# Patient Record
Sex: FEMALE | Race: WHITE | NOT HISPANIC OR LATINO
[De-identification: names, ages, dates, MRNs, and addresses within clinical notes are randomized per-mention and may not be internally consistent; named-entity substitution may affect disease eponyms.]

---

## 2018-05-14 PROBLEM — Z00.00 ENCOUNTER FOR PREVENTIVE HEALTH EXAMINATION: Status: ACTIVE | Noted: 2018-05-14

## 2018-05-15 ENCOUNTER — NON-APPOINTMENT (OUTPATIENT)
Age: 48
End: 2018-05-15

## 2018-05-15 ENCOUNTER — APPOINTMENT (OUTPATIENT)
Dept: CARDIOLOGY | Facility: CLINIC | Age: 48
End: 2018-05-15
Payer: MEDICAID

## 2018-05-15 VITALS
OXYGEN SATURATION: 97 % | HEART RATE: 90 BPM | DIASTOLIC BLOOD PRESSURE: 60 MMHG | SYSTOLIC BLOOD PRESSURE: 108 MMHG | HEIGHT: 66 IN | BODY MASS INDEX: 47.09 KG/M2 | WEIGHT: 293 LBS

## 2018-05-15 DIAGNOSIS — I10 ESSENTIAL (PRIMARY) HYPERTENSION: ICD-10-CM

## 2018-05-15 DIAGNOSIS — Z86.79 PERSONAL HISTORY OF OTHER DISEASES OF THE CIRCULATORY SYSTEM: ICD-10-CM

## 2018-05-15 DIAGNOSIS — Z01.818 ENCOUNTER FOR OTHER PREPROCEDURAL EXAMINATION: ICD-10-CM

## 2018-05-15 DIAGNOSIS — R07.9 CHEST PAIN, UNSPECIFIED: ICD-10-CM

## 2018-05-15 DIAGNOSIS — Z86.39 PERSONAL HISTORY OF OTHER ENDOCRINE, NUTRITIONAL AND METABOLIC DISEASE: ICD-10-CM

## 2018-05-15 DIAGNOSIS — R06.02 SHORTNESS OF BREATH: ICD-10-CM

## 2018-05-15 DIAGNOSIS — Z86.59 PERSONAL HISTORY OF OTHER MENTAL AND BEHAVIORAL DISORDERS: ICD-10-CM

## 2018-05-15 PROCEDURE — 93000 ELECTROCARDIOGRAM COMPLETE: CPT

## 2018-05-15 PROCEDURE — 99244 OFF/OP CNSLTJ NEW/EST MOD 40: CPT

## 2018-05-15 RX ORDER — LISINOPRIL AND HYDROCHLOROTHIAZIDE TABLETS 20; 25 MG/1; MG/1
20-25 TABLET ORAL DAILY
Refills: 0 | Status: ACTIVE | COMMUNITY

## 2018-05-15 RX ORDER — ALPRAZOLAM 1 MG/1
1 TABLET ORAL AS DIRECTED
Refills: 0 | Status: ACTIVE | COMMUNITY

## 2018-05-24 ENCOUNTER — APPOINTMENT (OUTPATIENT)
Dept: CARDIOLOGY | Facility: CLINIC | Age: 48
End: 2018-05-24
Payer: MEDICAID

## 2018-05-24 PROCEDURE — 93306 TTE W/DOPPLER COMPLETE: CPT

## 2018-05-25 ENCOUNTER — APPOINTMENT (OUTPATIENT)
Dept: CARDIOLOGY | Facility: CLINIC | Age: 48
End: 2018-05-25
Payer: MEDICAID

## 2018-05-25 ENCOUNTER — CLINICAL ADVICE (OUTPATIENT)
Age: 48
End: 2018-05-25

## 2018-05-25 PROCEDURE — 93015 CV STRESS TEST SUPVJ I&R: CPT

## 2018-05-25 PROCEDURE — A9502: CPT

## 2018-05-25 PROCEDURE — 78452 HT MUSCLE IMAGE SPECT MULT: CPT

## 2018-05-29 ENCOUNTER — RESULT REVIEW (OUTPATIENT)
Age: 48
End: 2018-05-29

## 2018-05-29 ENCOUNTER — OTHER (OUTPATIENT)
Age: 48
End: 2018-05-29

## 2018-06-05 ENCOUNTER — OUTPATIENT (OUTPATIENT)
Dept: OUTPATIENT SERVICES | Facility: HOSPITAL | Age: 48
LOS: 1 days | End: 2018-06-05
Payer: MEDICAID

## 2018-06-05 PROCEDURE — 93458 L HRT ARTERY/VENTRICLE ANGIO: CPT | Mod: 26

## 2018-06-08 ENCOUNTER — APPOINTMENT (OUTPATIENT)
Dept: CARDIOLOGY | Facility: CLINIC | Age: 48
End: 2018-06-08

## 2019-02-25 ENCOUNTER — OFFICE VISIT (OUTPATIENT)
Dept: SURGERY | Age: 49
End: 2019-02-25

## 2019-02-25 VITALS
RESPIRATION RATE: 20 BRPM | WEIGHT: 293 LBS | BODY MASS INDEX: 47.09 KG/M2 | SYSTOLIC BLOOD PRESSURE: 144 MMHG | HEART RATE: 83 BPM | HEIGHT: 66 IN | TEMPERATURE: 96.7 F | DIASTOLIC BLOOD PRESSURE: 79 MMHG

## 2019-02-25 DIAGNOSIS — E66.01 OBESITY, MORBID (HCC): Primary | ICD-10-CM

## 2019-02-25 DIAGNOSIS — Z98.84 LAP-BAND SURGERY STATUS: ICD-10-CM

## 2019-02-25 RX ORDER — AMLODIPINE BESYLATE 5 MG/1
5 TABLET ORAL
COMMUNITY
Start: 2018-07-30

## 2019-02-25 RX ORDER — LIDOCAINE 50 MG/G
PATCH TOPICAL
COMMUNITY
Start: 2018-07-20 | End: 2019-02-25

## 2019-02-25 RX ORDER — ALBUTEROL SULFATE 90 UG/1
2 AEROSOL, METERED RESPIRATORY (INHALATION)
COMMUNITY

## 2019-02-25 RX ORDER — HYDROXYZINE 25 MG/1
25 TABLET, FILM COATED ORAL
COMMUNITY
Start: 2018-08-07 | End: 2019-02-25

## 2019-02-25 RX ORDER — MONTELUKAST SODIUM 10 MG/1
10 TABLET ORAL
COMMUNITY

## 2019-02-25 RX ORDER — FUROSEMIDE 20 MG/1
20 TABLET ORAL
COMMUNITY

## 2019-02-25 RX ORDER — CYCLOBENZAPRINE HCL 10 MG
10 TABLET ORAL
COMMUNITY
Start: 2018-07-24 | End: 2019-02-25

## 2019-02-25 RX ORDER — LIDOCAINE 4 G/100G
1 PATCH TOPICAL
COMMUNITY
Start: 2018-07-19 | End: 2019-02-25

## 2019-02-25 RX ORDER — HYDROCHLOROTHIAZIDE 25 MG/1
25 TABLET ORAL
COMMUNITY
Start: 2018-08-07 | End: 2019-02-25

## 2019-02-25 RX ORDER — CLINDAMYCIN HYDROCHLORIDE 150 MG/1
150 CAPSULE ORAL
COMMUNITY
End: 2019-05-17

## 2019-02-25 RX ORDER — IBUPROFEN 600 MG/1
600 TABLET ORAL
COMMUNITY
Start: 2018-07-20 | End: 2019-05-17

## 2019-02-25 RX ORDER — CETIRIZINE HCL 10 MG
10 TABLET ORAL
COMMUNITY

## 2019-02-25 RX ORDER — ONDANSETRON 4 MG/1
4 TABLET, FILM COATED ORAL
COMMUNITY
Start: 2018-08-02 | End: 2019-02-25

## 2019-02-25 RX ORDER — TRAMADOL HYDROCHLORIDE 50 MG/1
50 TABLET ORAL
COMMUNITY
Start: 2019-02-20 | End: 2019-02-25

## 2019-02-25 RX ORDER — ACETAMINOPHEN 500 MG
500 TABLET ORAL
COMMUNITY
Start: 2018-07-20

## 2019-02-25 RX ORDER — EPINEPHRINE 0.3 MG/.3ML
1 INJECTION SUBCUTANEOUS
COMMUNITY
Start: 2018-03-11

## 2019-02-25 RX ORDER — BUDESONIDE AND FORMOTEROL FUMARATE DIHYDRATE 160; 4.5 UG/1; UG/1
2 AEROSOL RESPIRATORY (INHALATION)
COMMUNITY

## 2019-02-25 RX ORDER — ALPRAZOLAM 1 MG/1
1 TABLET ORAL
COMMUNITY
Start: 2017-07-27

## 2019-02-25 NOTE — PATIENT INSTRUCTIONS
If you have any questions or concerns about today's appointment, the verbal and/or written instructions you were given for follow up care, please call our office at [phone].     [facility] - [address]    [phone] office  [phone]

## 2019-02-25 NOTE — PROGRESS NOTES
Initial Consultation for Bariatric Surgery Template (Gastric Band revision )    Nayan Wallace is a 57 y.o. female who comes into the office today for initial consultation for the surgical options for the treatment of recurrent morbid obesity. She underwent Lap Banding in 2012 in [place]. She lost 50 lbs (385-320) with the band and was then diagnosed with \"ovarian and renal cancer. \" Her band was deflated for therapy. She states she is now cancer-free and wants her band removed and conerted to a sleeve gastrectomy. Her band has not been filled in any fashion since 2013. Today, the patient is  Height: 5' 6\" (167.6 cm) tall, Weight: (!) 159.7 kg (352 lb) lbs for a Body mass index is 56.81 kg/m². It is due to the patient's severe obesity, which is further complicated by hypertension and weight related arthopathies  that the patient is now seeking out bariatric surgery. Past Medical History:   Diagnosis Date    Anxiety     Arthritis     Hypertension     Ovarian [facility] ([facility] [address]     Stage 2    Renal carcinoma ([facility] [address]     Right T2       Past Surgical History:   Procedure Laterality Date    HC TOTAL HYSTERECTOMY  2013    Ovarian CA    HX CHOLECYSTECTOMY      HX GI  2012    Lap Band    NEPHRECTOMY Right     Partial right nephrectomy due to cancer       Current Outpatient Medications   Medication Sig Dispense Refill    cetirizine (ZYRTEC) 10 mg tablet Take 10 mg by mouth.  clindamycin (CLEOCIN) 150 mg capsule Take 150 mg by mouth.  EPINEPHrine (EPIPEN 2-KIERSTEN) 0.3 mg/0.3 mL injection 1 Cartridge.  furosemide (LASIX) 20 mg tablet Take 20 mg by mouth.  budesonide-formoterol (SYMBICORT) 160-4.5 mcg/actuation HFAA Take 2 Puffs by inhalation.  amLODIPine (NORVASC) 5 mg tablet Take 5 mg by mouth.  ALPRAZolam (XANAX) 1 mg tablet Take 1 mg by mouth.  albuterol (PROVENTIL HFA, VENTOLIN HFA, PROAIR HFA) 90 mcg/actuation inhaler Take 2 Puffs by inhalation.       acetaminophen (TYLENOL EXTRA STRENGTH) 500 mg tablet Take 500 mg by mouth.  ibuprofen (MOTRIN) 600 mg tablet Take 600 mg by mouth.  montelukast (SINGULAIR) 10 mg tablet Take 10 mg by mouth. Allergies   Allergen Reactions    Oxycodone Hives       Social History     Tobacco Use    Smoking status: Never Smoker    Smokeless tobacco: Never Used   Substance Use Topics    Alcohol use: No     Frequency: Never    Drug use: No       Family History   Problem Relation Age of Onset    No Known Problems Mother     Hypertension Father        Family Status   Relation Name Status    Mother  Alive    Father         Review of Systems:  Positive in BOLD    CONST: Fever, weight loss, fatigue or chills  GI: Nausea, vomiting, abdominal pain, change in bowel habits, hematochezia, melena, and GERD   INTEG: Dermatitis, abnormal moles  HEENT: Recent changes in vision, vertigo, epistaxis, dysphagia and hoarseness  CV: Chest pain, palpitations, HTN, edema and varicosities  RESP: Cough, shortness of breath, wheezing, hemoptysis, snoring and reactive airway disease  : Hematuria, dysuria, frequency, urgency, nocturia and stress urinary incontinence   MS: Weakness, joint pain and arthritis  ENDO: Diabetes, thyroid disease, polyuria, polydipsia, polyphagia, poor wound healing, heat intolerance, cold intolerance  LYMPH/HEME: Anemia, bruising and history of blood transfusions  NEURO: Dizziness, headache, fainting, seizures and stroke  PSYCH: Anxiety and depression      Physical Exam    Visit Vitals  /79 (BP 1 Location: Left arm, BP Patient Position: At rest)   Pulse 83   Temp 96.7 °F (35.9 °C) (Oral)   Resp 20   Ht 5' 6\" (1.676 m)   Wt (!) 159.7 kg (352 lb)   BMI 56.81 kg/m²       Pre op weight: 352  EBW: 218  Wt loss to date: 0       General: 57 y.o.) female in no acute distress.  Morbidly obese in sbdomen, hips and thighs - gynecoid pattern  HEENT: Normocephalic, atraumatic, Pupils equal and reactive, nasopharynx clear, oropharynx clear and moist without lesions  NECK: Supple, no lymphadenopathy, thyromegaly, carotid bruits or jugular venous distension. trachea midline  RESP: Clear to auscultation bilaterally, no wheezes, rhonchi, or rales, normal respiratory excursion  CV: Regular rate and rhythm, no murmurs, rubs or gallops. 3+/4 pulses in bilateral dorsalis pedis and posterior tibialis. No distal edema or varicosities. ABD: Soft, nontender, nondistended, normoactive bowel sounds, no hernias, no hepatosplenomegaly, easily palpable costal margins, gynecoid distribution, healed pfannenstiel incision - healed lap scars, port in LUQ. Extremities: Warm, well perfused, no tenderness or swelling, normal gait/station  Neuro: Sensation and strength grossly intact and symmetrical  Psych: Alert and oriented to person, place, and time. Impression:    Noa Cuevas is a 57 y.o. female who is suffering from morbid obesity with a BMI of 57  and comorbidities including hypertension and weight related arthopathies  who would benefit from bariatric surgery. However, she has an indwelling band that has never been used. We will try to use it with fluoro guided/UGI adjustments. If she is successful, no surgery planned. If not, will consider conversion at that point. Will also use [employer] dietary for eudcation.

## 2019-02-25 NOTE — PROGRESS NOTES
Jason Rosa is a 57 y.o. female who presents today with   Chief Complaint   Patient presents with    Morbid Obesity     Pt presents today to discussbariatric Revisional surgery. Pt has Lap band placed in 2013 in [place]. Pt reports starting weight was 385lbs then to 320lbs. Shorlty after pt was dx with Ovarian and Kidney CA. All fluid was removed from band and pt began to regain weight. Body mass index is 56.81 kg/m². 1. Have you been to the ER, urgent care clinic since your last visit? Hospitalized since your last visit? No    2. Have you seen or consulted any other health care providers outside of the [address] [place] [address] since your last visit? Include any pap smears or colon screening.  No

## 2019-02-26 DIAGNOSIS — Z98.84 LAP-BAND SURGERY STATUS: ICD-10-CM

## 2019-02-26 DIAGNOSIS — E66.01 OBESITY, MORBID (HCC): Primary | ICD-10-CM

## 2019-03-12 ENCOUNTER — TELEPHONE (OUTPATIENT)
Dept: SURGERY | Age: 49
End: 2019-03-12

## 2019-03-12 NOTE — TELEPHONE ENCOUNTER
Called [facility] for patient to call she needs to have her lap band fill and UGI scheduled Margaret Stephan has tried calling her twice.

## 2019-03-22 ENCOUNTER — HOSPITAL ENCOUNTER (OUTPATIENT)
Dept: GENERAL RADIOLOGY | Age: 49
Discharge: HOME | End: 2019-03-22
Attending: NURSE PRACTITIONER
Payer: COMMERCIAL

## 2019-03-22 DIAGNOSIS — Z98.84 LAP-BAND SURGERY STATUS: ICD-10-CM

## 2019-03-22 DIAGNOSIS — E66.01 OBESITY, MORBID (HCC): ICD-10-CM

## 2019-03-22 PROCEDURE — 74247 XR UPPER GI W KUB AIR CONT: CPT

## 2019-03-22 PROCEDURE — 74011000250 HC RX REV CODE- 250: Performed by: NURSE PRACTITIONER

## 2019-03-22 PROCEDURE — 74011000255 HC RX REV CODE- 255: Performed by: NURSE PRACTITIONER

## 2019-03-22 RX ADMIN — BARIUM SULFATE 135 ML: 980 POWDER, FOR SUSPENSION ORAL at 08:55

## 2019-03-22 RX ADMIN — ANTACID/ANTIFLATULENT 4 G: 380; 550; 10; 10 GRANULE, EFFERVESCENT ORAL at 08:55

## 2019-03-22 RX ADMIN — BARIUM SULFATE 176 G: 960 POWDER, FOR SUSPENSION ORAL at 08:55

## 2019-04-02 ENCOUNTER — TELEPHONE (OUTPATIENT)
Dept: SURGERY | Age: 49
End: 2019-04-02

## 2019-04-02 DIAGNOSIS — Z98.84 LAP-BAND SURGERY STATUS: ICD-10-CM

## 2019-04-02 DIAGNOSIS — E66.01 OBESITY, MORBID (HCC): Primary | ICD-10-CM

## 2019-04-02 NOTE — TELEPHONE ENCOUNTER
Called pt back to review UGI results and next phase in plan of care as requested. Reviewed results with pt and answered questions. She is to follow up with PENNY Veloz MD in office for a band fill/ adjustment. In the future these can be preformed under fluro if further adjustment is warranted. Pt to speak with surgery scheduler to facilitate scheduling. Pt communicates understanding of results and plan as above.

## 2019-04-08 ENCOUNTER — OFFICE VISIT (OUTPATIENT)
Dept: SURGERY | Age: 49
End: 2019-04-08

## 2019-04-08 VITALS
WEIGHT: 293 LBS | DIASTOLIC BLOOD PRESSURE: 87 MMHG | HEIGHT: 66 IN | HEART RATE: 101 BPM | TEMPERATURE: 98.3 F | SYSTOLIC BLOOD PRESSURE: 141 MMHG | RESPIRATION RATE: 20 BRPM | BODY MASS INDEX: 47.09 KG/M2

## 2019-04-08 DIAGNOSIS — E66.01 OBESITY, MORBID (HCC): ICD-10-CM

## 2019-04-08 DIAGNOSIS — Z98.84 LAP-BAND SURGERY STATUS: Primary | ICD-10-CM

## 2019-04-08 NOTE — PROGRESS NOTES
Asa Mejia is a 57 y.o. female who presents today with   Chief Complaint   Patient presents with    Morbid Obesity     Pt presents today for possible Lap Band fill. 1. Have you been to the ER, urgent care clinic since your last visit? Hospitalized since your last visit? No    2. Have you seen or consulted any other health care providers outside of the [address] [place] [address] since your last visit? Include any pap smears or colon screening.  No

## 2019-04-10 NOTE — PROGRESS NOTES
Subjective:    Solomon Winslow is a 57 y.o. female presents for postop care following laparoscopic adjustable gastric banding. She is here today needing lap band adjustment because of decreased satiety (pt. able to eat > 4 oz at one meal), early sense of hunger (within 1-2 hours after eating) and weight gain. Monica Mccarty is not measuring her food. She feels hungry in 2 hours. Objective:     Visit Vitals  /87 (BP 1 Location: Right arm, BP Patient Position: At rest)   Pulse (!) 101   Temp 98.3 °F (36.8 °C) (Oral)   Resp 20   Ht 5' 6\" (1.676 m)   Wt (!) 159.7 kg (352 lb)   BMI 56.81 kg/m²      Estimated body mass index is 56.81 kg/m² as calculated from the following:    Height as of this encounter: 5' 6\" (1.676 m). Weight as of this encounter: 159.7 kg (352 lb). Wt Readings from Last 3 Encounters:   04/08/19 (!) 159.7 kg (352 lb)   02/25/19 (!) 159.7 kg (352 lb)     Her change in weight since last visit: gained, 5 lbs. Abdomen: soft, bowel sounds active, non-tender     Assessment/Plan: Morbid Obesity, status post lap-band surgery, needing adjustment due to decreased satiety (pt. able to eat > 4 oz at one meal), early sense of hunger (within 1-2 hours after eating) and weight gain. Reviewed behaviors for band compliance. Follow-up in 1 month(s)    Gastric Band Adjustment Procedure    With the patient lying supine and standing the port area was prepped with chloraprep. Next, using sterile technique, the port was accessed using a Yanes needle without difficulty. Previous Fill Volume:  0 ml. Added: 2 ml  Removed: 0 ml    Band-aid applied. The patient tolerated several gulps of water without difficulty. Total time spent with patient: 20 minutes.

## 2019-05-17 ENCOUNTER — OFFICE VISIT (OUTPATIENT)
Dept: SURGERY | Age: 49
End: 2019-05-17

## 2019-05-17 VITALS
SYSTOLIC BLOOD PRESSURE: 160 MMHG | HEIGHT: 66 IN | BODY MASS INDEX: 47.09 KG/M2 | WEIGHT: 293 LBS | OXYGEN SATURATION: 97 % | HEART RATE: 101 BPM | TEMPERATURE: 97.8 F | DIASTOLIC BLOOD PRESSURE: 100 MMHG

## 2019-05-17 DIAGNOSIS — E66.01 MORBID OBESITY (HCC): Primary | ICD-10-CM

## 2019-05-17 DIAGNOSIS — K91.2 POSTOPERATIVE MALABSORPTION: ICD-10-CM

## 2019-05-17 DIAGNOSIS — F41.9 ANXIETY: ICD-10-CM

## 2019-05-17 DIAGNOSIS — I10 HYPERTENSION, UNSPECIFIED TYPE: ICD-10-CM

## 2019-05-17 DIAGNOSIS — M12.9 ARTHROPATHY: ICD-10-CM

## 2019-05-17 DIAGNOSIS — Z98.84 HISTORY OF ADJUSTABLE GASTRIC BANDING: ICD-10-CM

## 2019-05-17 NOTE — LETTER
5/17/19 Patient: Edyta Scott YOB: 1970 Date of Visit: 5/17/2019 Florine Merlin, MD 
[address] [facility] [place] [address] VIA Facsimile: [phone] Dear Florine Merlin, MD, Thank you for [facility] [place] [address] for evaluation. My notes for this consultation are attached. If you have questions, please do not hesitate to call me. I look forward to following your patient along with you.  
 
 
Sincerely, 
 
Oliver Roy PA-C

## 2019-05-17 NOTE — PROGRESS NOTES
Bariatric Follow Up Note Tammie Diaz is a 54 y.o. female is now 7 years  status post laparoscopic adjustable gastric banding performed in [place]. Doing well overall. Underwent office based fill by Dr. Kj Kulkarni on 10 April 2019. Describes 1 lbs weight gain since fill. Currently on a regular bariatric diet without difficulty. Taking in 100oz fluid,  Not counting g protein. Minimal exercise. The patient denies hair loss. Bowel movements are regular. The patient is not having any pain. She claims to be compliant with multivitamins, Protein, calcium, Vit D and B12 supplements. In the midst of attempting utilization of the band, if unsuccessful plan possible revision. Pt admits to not following a true bariatric diet, yet. Eating rice and  Drinking carbonated beverages. The patient reports no difficulty eating/drinking. Feeling unrestricted and no change since fill. The patient denies smoking, etOH use, NSAID use and admits to carbonation ingestion. Weight Loss Metrics 5/17/2019 4/8/2019 2/25/2019 2/25/2019 Pre op / Initial Wt - - 352 - Today's Wt 353 lb 352 lb - 352 lb  
BMI 56.98 kg/m2 56.81 kg/m2 - 56.81 kg/m2 Ideal Body Wt - - 134 - Excess Body Wt - - 218 - Goal Wt - - 178 - Wt loss to date - - 0 -  
% Wt Loss - - 0 -  
80% EBW - - 174.4 - Body mass index is 56.98 kg/m². Past Medical History:  
Diagnosis Date  Anxiety  Arthritis  Hypertension  Ovarian cancer ([facility] [address] Stage 2  Renal carcinoma ([facility] [address] Right T2 Past Surgical History:  
Procedure Laterality Date [address] TOTAL HYSTERECTOMY  2013 Ovarian CA  
 HX CHOLECYSTECTOMY  HX GI  2012 Lap Band  NEPHRECTOMY Right Partial right nephrectomy due to cancer Current Outpatient Medications Medication Sig Dispense Refill  cetirizine (ZYRTEC) 10 mg tablet Take 10 mg by mouth.  EPINEPHrine (EPIPEN 2-KIERSTEN) 0.3 mg/0.3 mL injection 1 Cartridge.  furosemide (LASIX) 20 mg tablet Take 20 mg by mouth.  budesonide-formoterol (SYMBICORT) 160-4.5 mcg/actuation HFAA Take 2 Puffs by inhalation.  amLODIPine (NORVASC) 5 mg tablet Take 5 mg by mouth.  ALPRAZolam (XANAX) 1 mg tablet Take 1 mg by mouth.  albuterol (PROVENTIL HFA, VENTOLIN HFA, PROAIR HFA) 90 mcg/actuation inhaler Take 2 Puffs by inhalation.  montelukast (SINGULAIR) 10 mg tablet Take 10 mg by mouth.  acetaminophen (TYLENOL EXTRA STRENGTH) 500 mg tablet Take 500 mg by mouth. Allergies Allergen Reactions  Peanut Hives Anaphylactic shock  Oxycodone Hives Review of Systems:  Positive in BOLD 
  
CONST: Fever, weight loss, fatigue or chills GI: Nausea, vomiting, abdominal pain, change in bowel habits, hematochezia, melena, and GERD INTEG: Dermatitis, abnormal moles HEENT: Recent changes in vision, vertigo, epistaxis, dysphagia and hoarseness CV: Chest pain, palpitations, HTN, edema and varicosities RESP: Cough, shortness of breath, wheezing, hemoptysis, snoring and reactive airway disease : Hematuria, dysuria, frequency, urgency, nocturia and stress urinary incontinence MS: Weakness, joint pain and arthritis ENDO: Diabetes, thyroid disease, polyuria, polydipsia, polyphagia, poor wound healing, heat intolerance, cold intolerance LYMPH/HEME: Anemia, bruising and history of blood transfusions NEURO: Dizziness, headache, fainting, seizures and stroke PSYCH: Anxiety and depression Physicial Exam: 
Visit Vitals BP (!) 160/100 (BP 1 Location: Right arm, BP Patient Position: Sitting) Pulse (!) 101 Temp 97.8 °F (36.6 °C) (Oral) Ht 5' 6\" (1.676 m) Wt (!) 160.1 kg (353 lb) SpO2 97% BMI 56.98 kg/m² Pt took nebulizer and prednisone prior to arrival elevating HR and BP. Given HTN, pt advised to f/u with PCP w/in next month for evaluation. General: AAOX3, pleasant and cooperative to exam. Appropriately groomed. NAD. Non-toxic in appearance. Appears stated age. HENT: NC/AT. PERRLA. Extraocular motions are intact. Sclera anicteric, Conjunctiva Clear. Nares clear. Oropharynx pink, moist without exudate or erythema. Uvula Midline. Neck:  Supple, trachea is midline. No JVD, Lymphadenopathy. No bruits. Chest: Good equal bilateral expansion Lungs: Clear to auscultation bilaterally without e/o crackles, wheezes or rhales. +cough/asthma seasonal allergies. No wheeze. Heart: RRR, S1 and S2 noted. No c/r/m/g/vpmi. Mildly tachy. Abdomen: obese, soft and non-tender without distension. Good bowel sounds. No vis/palp masses or pulsations. No organo-splenomegaly. No hernias to my exam. No e/o acute abdomen or peritoneal signs. Previous surgical wounds well-healed. Port easily palpable. Pelvis: Stable. :  Deferred Rectal: Deferred Extremities: Positive pulses in all 4 extremities. Baseline range of motion in all 4 extremities. Strength, sensation and reflexes intact, appropriate and equal in b/l upper and lower extremities. No C/C/E Neuro: CN II-XII grossly intact without focal deficit. Ambulatory. Skin: Clean, warm and dry. Labs: will obtain labs and review. Will plan to manage appropriately. Assessment/Plan: Pt is currently 7 years s/p laparoscopic adjustable gastric banding with a total weight loss of about 40 lbs to date, struggling a bit. Stressed importance of hydration with SF non carbonated clear liquids until urine clear. OK to continue bariatric diet, with food content mainly meats/veggies. Encouraged support group attendance, recommended dietician visit with food diary. Advised exercise program of 20-30 minutes daily 5-7 times per week. Recommended utilizing bariatric multivitamins or at least adult chewable multivitamins in lieu of flintstones and/or flintstones gummies. Follow up in fluoro lab for eval/fill once able to schedule, sooner as needed. Health Maintenance issues reviewed and except as it relates to bariatrics, deferred to primary care. Plan eval under fluoro. Pt understands at that time, based on results of study, we will either provide a fill of the band, leave fluid amount unchanged in the system or remove fluid if necessary. Risks benefits complications and alternatives to procedure have been reviewed with the patient who voices understanding and desire to proceed. Greater than 50% of this 30 minute visit was spent couseling the patient about the aforementioned issues.   
 
 
 
Emmett Hand MS, PA-C

## 2019-05-17 NOTE — PROGRESS NOTES
Ryland Murcia is a 54 y.o. female (: 1970) presenting to address: Chief Complaint Patient presents with  Weight Management  
  lap band follow up/ refill in office 19 by kaur. Patient is here for a 1 month lap band follow up. Lap band refill was done in the office by Karol Neff on 2019. Medication list and allergies have been reviewed with Yamini Quiñones and updated as of today's date. I have gone over all Medical, Surgical and Social History with Yamini Quiñones and updated/added the information accordingly. 1. Have you been to the ER, Urgent Care or Hospitalized since your last visit? YES 
 
 
2. Have you followed up with your PCP or any other Physicians since your procedure/ last office visit? YES Patient went to [facility] due to asthma problems. May 11,2019. Patient went in for a asthma treatment and was prescribed prednisone.

## 2019-05-17 NOTE — PATIENT INSTRUCTIONS
Information Regarding Dietary Services Through [address] sees patients at the [facility] clinic every Tuesday, Thursdays, and every other Wednesday. The clinic information is [address], [place], [address] ph [phone] and fax [phone]. Estela Myers RD sees patients at the [facility] at [address], [address] [facility] ph [phone] and fax [phone]. She is there Tuesday-Friday each week. Your health insurance MAY cover part or all of the cost of the nutrition counseling. Coverage varies greatly among plans (even under the same company). If you would like to see if your insurance covers this service, we encourage you to contact your insurance company and ask about coverage for the following procedure codes:  81612 (Nutrition evaluation) and 48634 (Nutrition Follow Up). If you do have some coverage and you would like us to file a claim for you, we then would need for you to have your physician/provider fax us a referral (can be written on a prescription pad or his/her office letterhead) to one of our clinics. Be sure to have your physician list any nutritionally related diagnoses that may apply to you such as diabetes, obesity or overweight, hypertension, high cholesterol, eating disorder, etc  Be sure also to make sure that the referral has contact information for you, as well. From there, our staff will call you to get your insurance information and schedule an appointment for you if you would like to proceed. If you do not care to go through your insurance (or find out that you do not have coverage for nutrition counseling) you can meet with one of the dietitians and pay out of pocket. The current fees for this are $96 for the initial one hour evaluation visit and $48 for any subsequent follow up visits.   If you decide to pay out of pocket, a physician referral is not necessary and you may call the number at the clinic that is convenient for you to set up an appointment. Hector Shah [facility] Locations: [facility]): phone: [phone], fax: [phone] [facility]): phone: [phone] fax: [phone] [facility]): phone: [phone], fax: [phone] [address] [place] [address]): phone: [phone] fax: [phone] [facility]): phone [phone] fax [phone] Justyn [address] [place] [address]): phone [phone], fax [phone] [address] (Madeline Pederson): phone [phone], fax: [phone]

## 2019-05-29 ENCOUNTER — HOSPITAL ENCOUNTER (OUTPATIENT)
Dept: GENERAL RADIOLOGY | Age: 49
Discharge: HOME | End: 2019-05-29
Attending: PHYSICIAN ASSISTANT
Payer: COMMERCIAL

## 2019-05-29 ENCOUNTER — HOSPITAL ENCOUNTER (OUTPATIENT)
Dept: LAB | Age: 49
Discharge: HOME | End: 2019-05-29
Attending: PHYSICIAN ASSISTANT
Payer: COMMERCIAL

## 2019-05-29 ENCOUNTER — DOCUMENTATION ONLY (OUTPATIENT)
Dept: SURGERY | Age: 49
End: 2019-05-29

## 2019-05-29 DIAGNOSIS — Z98.84 HISTORY OF ADJUSTABLE GASTRIC BANDING: ICD-10-CM

## 2019-05-29 DIAGNOSIS — E66.01 MORBID OBESITY (HCC): ICD-10-CM

## 2019-05-29 LAB
25(OH)D3 SERPL-MCNC: 13.5 NG/ML (ref 30–100)
ALBUMIN SERPL-MCNC: 3.7 G/DL (ref 3.4–5)
ANION GAP SERPL CALC-SCNC: 9 MMOL/L (ref 3–18)
BASOPHILS # BLD: 0 K/UL (ref 0–0.1)
BASOPHILS NFR BLD: 0 % (ref 0–2)
BUN SERPL-MCNC: 9 MG/DL (ref 7–18)
BUN/CREAT SERPL: 10 (ref 12–20)
CALCIUM SERPL-MCNC: 8.6 MG/DL (ref 8.5–10.1)
CHLORIDE SERPL-SCNC: 105 MMOL/L (ref 100–108)
CO2 SERPL-SCNC: 29 MMOL/L (ref 21–32)
CREAT SERPL-MCNC: 0.87 MG/DL (ref 0.6–1.3)
DIFFERENTIAL METHOD BLD: NORMAL
EOSINOPHIL # BLD: 0.3 K/UL (ref 0–0.4)
EOSINOPHIL NFR BLD: 5 % (ref 0–5)
ERYTHROCYTE [DISTWIDTH] IN BLOOD BY AUTOMATED COUNT: 13 % (ref 11.6–14.5)
FERRITIN SERPL-MCNC: 73 NG/ML (ref 8–388)
FOLATE SERPL-MCNC: 6.2 NG/ML (ref 3.1–17.5)
GLUCOSE SERPL-MCNC: 135 MG/DL (ref 74–99)
HCT VFR BLD AUTO: 44 % (ref 35–45)
HGB BLD-MCNC: 14.8 G/DL (ref 12–16)
IRON SERPL-MCNC: 74 UG/DL (ref 50–175)
LYMPHOCYTES # BLD: 1.9 K/UL (ref 0.9–3.6)
LYMPHOCYTES NFR BLD: 29 % (ref 21–52)
MCH RBC QN AUTO: 28.9 PG (ref 24–34)
MCHC RBC AUTO-ENTMCNC: 33.6 G/DL (ref 31–37)
MCV RBC AUTO: 85.9 FL (ref 74–97)
MONOCYTES # BLD: 0.4 K/UL (ref 0.05–1.2)
MONOCYTES NFR BLD: 6 % (ref 3–10)
NEUTS SEG # BLD: 4.1 K/UL (ref 1.8–8)
NEUTS SEG NFR BLD: 60 % (ref 40–73)
PLATELET # BLD AUTO: 235 K/UL (ref 135–420)
PMV BLD AUTO: 10 FL (ref 9.2–11.8)
POTASSIUM SERPL-SCNC: 3.9 MMOL/L (ref 3.5–5.5)
RBC # BLD AUTO: 5.12 M/UL (ref 4.2–5.3)
SODIUM SERPL-SCNC: 143 MMOL/L (ref 136–145)
TSH SERPL DL<=0.05 MIU/L-ACNC: 2.04 UIU/ML (ref 0.36–3.74)
VIT B12 SERPL-MCNC: 1332 PG/ML (ref 211–911)
WBC # BLD AUTO: 6.8 K/UL (ref 4.6–13.2)

## 2019-05-29 PROCEDURE — 74011636320 HC RX REV CODE- 636/320: Performed by: PHYSICIAN ASSISTANT

## 2019-05-29 PROCEDURE — 82040 ASSAY OF SERUM ALBUMIN: CPT

## 2019-05-29 PROCEDURE — 82728 ASSAY OF FERRITIN: CPT

## 2019-05-29 PROCEDURE — 84443 ASSAY THYROID STIM HORMONE: CPT

## 2019-05-29 PROCEDURE — 76000 FLUOROSCOPY <1 HR PHYS/QHP: CPT

## 2019-05-29 PROCEDURE — 82607 VITAMIN B-12: CPT

## 2019-05-29 PROCEDURE — 80048 BASIC METABOLIC PNL TOTAL CA: CPT

## 2019-05-29 PROCEDURE — 36415 COLL VENOUS BLD VENIPUNCTURE: CPT

## 2019-05-29 PROCEDURE — 83540 ASSAY OF IRON: CPT

## 2019-05-29 PROCEDURE — 85025 COMPLETE CBC W/AUTO DIFF WBC: CPT

## 2019-05-29 PROCEDURE — 84425 ASSAY OF VITAMIN B-1: CPT

## 2019-05-29 PROCEDURE — 82306 VITAMIN D 25 HYDROXY: CPT

## 2019-05-29 RX ADMIN — IOHEXOL 150 ML: 240 INJECTION, SOLUTION INTRATHECAL; INTRAVASCULAR; INTRAVENOUS; ORAL at 08:24

## 2019-05-29 NOTE — PROGRESS NOTES
Procedure Note      Subjective:    Harriet Dalton is a 54 y.o. female presents for postop care following laparoscopic adjustable gastric banding. She is here today for evaluation of the band under fluoroscopy. The patient denies any feelings of restriction. She most recently had 2 cc's of saline added to her banding system in the office by Dr. Maicol Johnson. Objective: There were no vitals taken for this visit. Estimated body mass index is 56.98 kg/m² as calculated from the following:    Height as of 5/17/19: 5' 6\" (1.676 m). Weight as of 5/17/19: 160.1 kg (353 lb). Wt Readings from Last 3 Encounters:   05/17/19 (!) 160.1 kg (353 lb)   04/08/19 (!) 159.7 kg (352 lb)   02/25/19 (!) 159.7 kg (352 lb)     Her change in weight since last visit: gained, 0.4 lbs. Abdomen: Soft,obese, bowel sounds active, non-tender, no hepatosplenomegaly, no hernias. Previous wounds well healed. Port easily palpable. Assessment/Plan: Morbid Obesity, status post lap-band surgery, needing Fluoroscopic assessment with possible fill due to decreased satiety (pt. able to eat > 4 oz at one meal) and early sense of hunger (within 1-2 hours after eating). Reviewed behaviors for band compliance. Follow-up in 2 week(s)  Pt placed on band holiday, all fluid removed, pt given prescription for carafate and PPI. Pt will be on liquid diet for 2 days, full liquid/soft diet for 2 days then resume diet. The importance of dietician follow up has also been stressed. Gastric Band Adjustment Procedure    Once in the radiology suite, The risks, benefits, complications and alternatives of the procedure were discussed with the patient who voices an understanding and a desire to proceed. We both agree the potential benefits outweigh any of the potential risks. The patient is placed on the fluoroscopy machine in the standing position. The port area was prepped with chlorhexidine.  Next, using sterile technique, the port was accessed using a Yanes needle Without difficulty. 3.25 mL of NS was confirmed in the lap band system. 1.0 mL of NS was added to the lap band system. Total in banding of system is 4.25 mL    Findings of the fluoroscopy: Initial fluoro demonstrated the band in good position, no e/o slip. No esophageal dilation or pouch dilation. No reflux with minimal restriction as the contrast flowed through the band. 3.25 mL confirmed in band then 1 mL of Saline added. Repeat fluoro post fill demonstrated band in proper position. No esophageal dilation. Slower transit time with increased restriction. No gerd. No e/o over restriction. Pt agreed. Left total of 4.25 mL in banding system. Band-aid applied. The patient tolerated several gulps of water following the without difficulty. The patient tolerated the procedure without difficulty and or complication and there was minimal, if any, blood loss. Total time spent with patient: 30 minutes.        Jessi Addison MS, PADevanteC

## 2019-05-30 ENCOUNTER — DOCUMENTATION ONLY (OUTPATIENT)
Dept: SURGERY | Age: 49
End: 2019-05-30

## 2019-06-02 LAB — VIT B1 BLD-SCNC: 147.4 NMOL/L (ref 66.5–200)

## 2020-07-21 ENCOUNTER — OFFICE VISIT (OUTPATIENT)
Dept: SURGERY | Age: 50
End: 2020-07-21

## 2020-07-21 VITALS
WEIGHT: 293 LBS | RESPIRATION RATE: 18 BRPM | DIASTOLIC BLOOD PRESSURE: 82 MMHG | TEMPERATURE: 97.4 F | OXYGEN SATURATION: 97 % | SYSTOLIC BLOOD PRESSURE: 134 MMHG | HEIGHT: 66 IN | BODY MASS INDEX: 47.09 KG/M2 | HEART RATE: 86 BPM

## 2020-07-21 DIAGNOSIS — R10.13 EPIGASTRIC PAIN: Primary | ICD-10-CM

## 2020-07-21 DIAGNOSIS — Z98.84 LAP-BAND SURGERY STATUS: ICD-10-CM

## 2020-07-21 DIAGNOSIS — R11.2 NAUSEA AND VOMITING, INTRACTABILITY OF VOMITING NOT SPECIFIED, UNSPECIFIED VOMITING TYPE: ICD-10-CM

## 2020-07-21 DIAGNOSIS — E66.01 MORBID OBESITY (HCC): ICD-10-CM

## 2020-07-21 DIAGNOSIS — Z78.9 WEIGHT LOSS ADVISED: ICD-10-CM

## 2020-07-21 DIAGNOSIS — R13.10 DYSPHAGIA, UNSPECIFIED TYPE: ICD-10-CM

## 2020-07-21 RX ORDER — ONDANSETRON 4 MG/1
4 TABLET, ORALLY DISINTEGRATING ORAL
Qty: 10 TAB | Refills: 0 | Status: SHIPPED | OUTPATIENT
Start: 2020-07-21 | End: 2020-11-20

## 2020-07-21 RX ORDER — SUCRALFATE 1 G/10ML
1 SUSPENSION ORAL 4 TIMES DAILY
Qty: 414 ML | Refills: 1 | Status: SHIPPED | OUTPATIENT
Start: 2020-07-21 | End: 2020-11-20

## 2020-07-21 RX ORDER — HYOSCYAMINE SULFATE 0.12 MG/1
0.12 TABLET SUBLINGUAL
Qty: 10 TAB | Refills: 0 | Status: SHIPPED | OUTPATIENT
Start: 2020-07-21 | End: 2020-08-03

## 2020-07-21 NOTE — PROGRESS NOTES
Chief Complaint   Patient presents with    Follow-up     pt c/o unable to hold down food or liquids. pt had Lap Band in 2012, last fill 4/10/19 last f/u 5/29/2019. Pt c/o nausea and throwing up flem. States when she drinks it feels like it's gurgling and then comes back up. Pt ID confirmed    Weight Loss Metrics 7/21/2020 5/17/2019 4/8/2019 2/25/2019 2/25/2019   Pre op / Initial Wt - - - 352 -   Today's Wt 359 lb 14.4 oz 353 lb 352 lb - 352 lb   BMI 58.09 kg/m2 56.98 kg/m2 56.81 kg/m2 - 56.81 kg/m2   Ideal Body Wt - - - 134 -   Excess Body Wt - - - 218 -   Goal Wt - - - 178 -   Wt loss to date - - - 0 -   % Wt Loss - - - 0 -   80% EBW - - - 174.4 -       Body mass index is 58.09 kg/m².

## 2020-07-28 ENCOUNTER — TELEPHONE (OUTPATIENT)
Dept: SURGERY | Age: 50
End: 2020-07-28

## 2020-07-28 NOTE — TELEPHONE ENCOUNTER
Per fax from [facility] pt requesting refill  Requested Prescriptions     Pending Prescriptions Disp Refills    hyoscyamine SL (Levsin/SL) 0.125 mg SL tablet 10 Tab 0     Si Tab by SubLINGual route every six (6) hours as needed for Cramping.

## 2020-08-03 RX ORDER — HYOSCYAMINE SULFATE 0.12 MG/1
TABLET SUBLINGUAL
Qty: 10 TAB | Refills: 0 | Status: SHIPPED | OUTPATIENT
Start: 2020-08-03

## 2020-08-03 NOTE — PROGRESS NOTES
Bariatric Postoperative Progress Note    Maritza Greene is a 52 y.o. female is now 8 years status post laparoscopic adjustable gastric band surgery she presents urgently today with concern for obstruction. Currently on a regular diet with difficulty, for past several days experiencing increasing abdominal pain, dysphagia, and vomiting. Not tolerating any solid or liquids. Patient reports she eats or drinks there is discomfort, gurgling sensations, and then she vomits. This is not effected by volume as she is not even tolerating sips of water. This has never happened to her before and s/s started after eating, she fears something is stuck. Last seen 5/29/19 by ANDREY Ann, 1mL was added under fluoro, total 4.25mL suspected in banding system. Weight Loss Metrics 7/21/2020 5/17/2019 4/8/2019 2/25/2019 2/25/2019   Pre op / Initial Wt - - - 352 -   Today's Wt 359 lb 14.4 oz 353 lb 352 lb - 352 lb   BMI 58.09 kg/m2 56.98 kg/m2 56.81 kg/m2 - 56.81 kg/m2   Ideal Body Wt - - - 134 -   Excess Body Wt - - - 218 -   Goal Wt - - - 178 -   Wt loss to date - - - 0 -   % Wt Loss - - - 0 -   80% EBW - - - 174.4 -         Past Medical History:   Diagnosis Date    Anxiety     Arthritis     Hypertension     Ovarian cancer ([facility] [address]     Stage 2    Renal carcinoma (HCC)     Right T2       Past Surgical History:   Procedure Laterality Date    ADJUSTMENT GASTRIC BAND N/A 05/29/2019    ANDREY Fox    [facility] [place]. TOTAL HYSTERECTOMY  2013    Ovarian CA    HX CHOLECYSTECTOMY  2017    HX GI  2012    Lap Band    NEPHRECTOMY Right     Partial right nephrectomy due to cancer       Current Outpatient Medications   Medication Sig Dispense Refill    ondansetron (ZOFRAN ODT) 4 mg disintegrating tablet Take 1 Tab by mouth every eight (8) hours as needed for Nausea or Nausea or Vomiting. 10 Tab 0    sucralfate (CARAFATE) 100 mg/mL suspension Take 10 mL by mouth four (4) times daily.  414 mL 1    cetirizine (ZYRTEC) 10 mg tablet Take 10 mg by mouth.  EPINEPHrine (EPIPEN 2-KIERSTEN) 0.3 mg/0.3 mL injection 1 Cartridge.  furosemide (LASIX) 20 mg tablet Take 20 mg by mouth.  budesonide-formoterol (SYMBICORT) 160-4.5 mcg/actuation HFAA Take 2 Puffs by inhalation.  amLODIPine (NORVASC) 5 mg tablet Take 5 mg by mouth.  ALPRAZolam (XANAX) 1 mg tablet Take 1 mg by mouth DIALYSIS PRN.  albuterol (PROVENTIL HFA, VENTOLIN HFA, PROAIR HFA) 90 mcg/actuation inhaler Take 2 Puffs by inhalation.  montelukast (SINGULAIR) 10 mg tablet Take 10 mg by mouth.  hyoscyamine SL (LEVSIN/SL) 0.125 mg SL tablet DISSOLVE 1 TABLET UNDER THE TONGUE EVERY 6 HOURS AS NEEDED FOR CRAMPING 10 Tab 0    acetaminophen (TYLENOL EXTRA STRENGTH) 500 mg tablet Take 500 mg by mouth. Allergies   Allergen Reactions    Peanut Hives     Anaphylactic shock     Oxycodone Hives       ROS:  Per HPI     Physicial Exam:  Visit Vitals  /82   Pulse 86   Temp 97.4 °F (36.3 °C) (Oral)   Resp 18   Ht 5' 6\" (1.676 m)   Wt (!) 163.2 kg (359 lb 14.4 oz)   SpO2 97%   BMI 58.09 kg/m²     Physical Exam  Vitals signs and nursing note reviewed. Constitutional:       Appearance: She is obese. HENT:      Head: Normocephalic and atraumatic. Eyes:      Pupils: Pupils are equal, round, and reactive to light. Cardiovascular:      Rate and Rhythm: Normal rate. Pulses: Normal pulses. Heart sounds: Normal heart sounds. Pulmonary:      Effort: Pulmonary effort is normal.      Breath sounds: Normal breath sounds. Abdominal:      General: Bowel sounds are normal. There is no distension. Palpations: Abdomen is soft. There is no mass. Tenderness: There is abdominal tenderness. There is no guarding or rebound. Hernia: No hernia is present. Musculoskeletal: Normal range of motion. Skin:     General: Skin is warm and dry. Neurological:      Mental Status: She is alert and oriented to person, place, and time.  Mental status is at baseline. Psychiatric:         Mood and Affect: Mood and affect normal.         Behavior: Behavior normal.         Assessment/Plan: Today, DAT [facility] is  Height: 5' 6\" (167.6 cm) tall, Weight: (!) 163.2 kg (359 lb 14.4 oz) lbs for a Body mass index is 58.09 kg/m². and are suffering from morbid obesity . They are currently 8 years s/p laparoscopic adjustable gastric band surgery, in [place], have s/s of obstruction. Deflation of band recommended, patient agrees, verbal and written consent given by the patient to remove fluid from her lap band in the office. Gastric Band Adjustment Procedure  Port area was prepped with alcohol. Next, using sterile technique, the port was accessed using a lap band needle, with assistance from Dr. Casie Chand, the port was accessed without difficulty, and approx. 5mL was removed. Band-aid applied. Previous Fill Volume:  4.25ml. Added: 0 ml  Removed: 5 ml  New Total: 0 mL      The patient tolerated several gulps of water with improved abdominal pain but some minor cramping feelings. There was no vomiting and the patient reported she could feel fluid passing the area of the band. She was instructed to remain in the room for 30 min sipping water and if she developed difficulty to notify the staff. She sipped without incident. If she develops dysphagia, the patient will contact the office urgently. Patient understands that she should only drink clear liquids for the next 48-72 hours, she should then progress to a soft diet and onward from there to stage 4 bariatric diet. UGI to assess anatomy recommended. We have reviewed the components of a successful postoperative course including requirement for a high protein, low carbohydrate diet, 64 oz a day of zero calorie liquids, daily vitamin supplementation, daily exercise (150 mis/week), regular follow-up, and participation in support groups.     Orders Placed This Encounter    XR GASTROGRAFFIN UPPER GI Standing Status:   Future     Standing Expiration Date:   2020     Order Specific Question:   Is Patient Pregnant? Answer:   Unknown     Order Specific Question:   Reason for Exam     Answer:   s/p lap band, obstructive s/s for 48 hrs s/p eating, removed band fluid in office pt now able to drink but continues to experience pain     Order Specific Question:   Which facility to perform procedure? Answer:   [facility]    DISCONTD: hyoscyamine SL (Levsin/SL) 0.125 mg SL tablet     Si Tab by SubLINGual route every six (6) hours as needed for Cramping. Dispense:  10 Tab     Refill:  0    ondansetron (ZOFRAN ODT) 4 mg disintegrating tablet     Sig: Take 1 Tab by mouth every eight (8) hours as needed for Nausea or Nausea or Vomiting. Dispense:  10 Tab     Refill:  0    sucralfate (CARAFATE) 100 mg/mL suspension     Sig: Take 10 mL by mouth four (4) times daily. Dispense:  414 mL     Refill:  1       The primary encounter diagnosis was Epigastric pain. Diagnoses of Dysphagia, unspecified type, Nausea and vomiting, intractability of vomiting not specified, unspecified vomiting type, and LAP-BAND surgery status were also pertinent to this visit.       1 mo to review UGI and access progress   Marylen Manna, NP

## 2020-08-04 RX ORDER — HYOSCYAMINE SULFATE 0.12 MG/1
0.12 TABLET SUBLINGUAL
Qty: 10 TAB | Refills: 0 | OUTPATIENT
Start: 2020-08-04

## 2020-09-29 NOTE — PROGRESS NOTES
Bariatric Postoperative Progress Note    Velia Osborn is a 52 y.o. female is now 8 years status post laparoscopic adjustable gastric band surgery   She is requesting other options for weight loss and treatment for her recurrent morbid obesity. She does not feel like the band is working for her. She is up 3lbs over the past year and total weight loss is approx 30lbs. It is due to the patient's severe obesity, which is further complicated by hypertension and weight related arthopathies  that the patient is now seeking out bariatric surgery. Weight Loss Metrics 9/30/2020 7/21/2020 5/17/2019 4/8/2019 2/25/2019 2/25/2019   Pre op / Initial Wt 385 - - - 352 -   Today's Wt 355 lb 359 lb 14.4 oz 353 lb 352 lb - 352 lb   BMI 57.3 kg/m2 58.09 kg/m2 56.98 kg/m2 56.81 kg/m2 - 56.81 kg/m2   Ideal Body Wt 134 - - - 134 -   Excess Body Wt 251 - - - 218 -   Goal Wt 178 - - - 178 -   Wt loss to date 30 - - - 0 -   % Wt Loss 0.15 - - - 0 -   80% .8 - - - 174.4 -         Past Medical History:   Diagnosis Date    Anxiety     Arthritis     Hypertension     Ovarian cancer ([facility] [address]     Stage 2    Renal carcinoma (HCC)     Right T2       Past Surgical History:   Procedure Laterality Date    ADJUSTMENT GASTRIC BAND N/A 05/29/2019    ANDREY Caldera    [facility]. TOTAL HYSTERECTOMY  2013    Ovarian CA    HX CHOLECYSTECTOMY  2017    HX GI  2012    Lap Band    NEPHRECTOMY Right     Partial right nephrectomy due to cancer       Current Outpatient Medications   Medication Sig Dispense Refill    losartan (COZAAR) 100 mg tablet TK 1 T PO  QD      cetirizine (ZYRTEC) 10 mg tablet Take 10 mg by mouth.  furosemide (LASIX) 20 mg tablet Take 20 mg by mouth.  budesonide-formoterol (SYMBICORT) 160-4.5 mcg/actuation HFAA Take 2 Puffs by inhalation.  amLODIPine (NORVASC) 5 mg tablet Take 5 mg by mouth.  ALPRAZolam (XANAX) 1 mg tablet Take 1 mg by mouth DIALYSIS PRN.       albuterol (PROVENTIL HFA, VENTOLIN HFA, PROAIR HFA) 90 mcg/actuation inhaler Take 2 Puffs by inhalation.  acetaminophen (TYLENOL EXTRA STRENGTH) 500 mg tablet Take 500 mg by mouth.  montelukast (SINGULAIR) 10 mg tablet Take 10 mg by mouth.  hyoscyamine SL (LEVSIN/SL) 0.125 mg SL tablet DISSOLVE 1 TABLET UNDER THE TONGUE EVERY 6 HOURS AS NEEDED FOR CRAMPING 10 Tab 0    ondansetron (ZOFRAN ODT) 4 mg disintegrating tablet Take 1 Tab by mouth every eight (8) hours as needed for Nausea or Nausea or Vomiting. 10 Tab 0    sucralfate (CARAFATE) 100 mg/mL suspension Take 10 mL by mouth four (4) times daily. 414 mL 1    EPINEPHrine (EPIPEN 2-KIERSTEN) 0.3 mg/0.3 mL injection 1 Cartridge. Allergies   Allergen Reactions    Peanut Hives     Anaphylactic shock     Oxycodone Hives     Review of Systems:  Positive in BOLD  CONST: Fever, weight loss, fatigue or chills  GI: Nausea, vomiting, abdominal pain, change in bowel habits, hematochezia, melena, and GERD   INTEG: Dermatitis, abnormal moles  HEENT: Recent changes in vision, vertigo, epistaxis, dysphagia and hoarseness  CV: Chest pain, palpitations, HTN, edema and varicosities  RESP: Cough, shortness of breath, wheezing, hemoptysis, snoring and reactive airway disease  : Hematuria, dysuria, frequency, urgency, nocturia and stress urinary incontinence   MS: Weakness, joint pain and arthritis  ENDO: Diabetes, thyroid disease, polyuria, polydipsia, polyphagia, poor wound healing, heat intolerance, cold intolerance  LYMPH/HEME: Anemia, bruising and history of blood transfusions  NEURO: Dizziness, headache, fainting, seizures and stroke  PSYCH: Anxiety and depression    Physicial Exam:  Visit Vitals  Ht 5' 6\" (1.676 m)   Wt (!) 161 kg (355 lb)   BMI 57.30 kg/m²     Physical Exam  Vitals signs and nursing note reviewed. Constitutional:       Appearance: She is obese. HENT:      Head: Normocephalic.    Pulmonary:      Effort: Pulmonary effort is normal.   Neurological:      Mental Status: She is alert and oriented to person, place, and time. Psychiatric:         Mood and Affect: Mood and affect normal.          Assessment/Plan: Today, Keara Quiñones is  Height: 5' 6\" (167.6 cm) tall, Weight: (!) 161 kg (355 lb) lbs for a Body mass index is 57.3 kg/m². and are suffering from morbid obesity . They are currently 8 years s/p laparoscopic adjustable gastric band surgery. She is being seen today following the deflation of band due to possible obstruction. She continues to struggle with weight loss/ regain and is seeking out a band removal or conversion. She has previously seen Dr. Jing Rouse, after confirming her benefits we will have the patient follow up with him. We have reviewed the components of a successful postoperative course including requirement for a high protein, low carbohydrate diet, 64 oz a day of zero calorie liquids, daily vitamin supplementation, daily exercise (150 mis/week), regular follow-up, and participation in support groups.       Christoph Mayberry NP

## 2020-09-30 ENCOUNTER — VIRTUAL VISIT (OUTPATIENT)
Dept: SURGERY | Age: 50
End: 2020-09-30
Payer: COMMERCIAL

## 2020-09-30 VITALS — WEIGHT: 293 LBS | BODY MASS INDEX: 47.09 KG/M2 | HEIGHT: 66 IN

## 2020-09-30 DIAGNOSIS — E66.01 OBESITY, MORBID (HCC): Primary | ICD-10-CM

## 2020-09-30 DIAGNOSIS — Z98.84 LAP-BAND SURGERY STATUS: ICD-10-CM

## 2020-09-30 PROCEDURE — 99213 OFFICE O/P EST LOW 20 MIN: CPT | Performed by: NURSE PRACTITIONER

## 2020-09-30 RX ORDER — LOSARTAN POTASSIUM 100 MG/1
TABLET ORAL
COMMUNITY
Start: 2020-08-20

## 2020-11-20 ENCOUNTER — OFFICE VISIT (OUTPATIENT)
Dept: SURGERY | Age: 50
End: 2020-11-20
Payer: COMMERCIAL

## 2020-11-20 VITALS
RESPIRATION RATE: 20 BRPM | TEMPERATURE: 96.8 F | BODY MASS INDEX: 47.09 KG/M2 | SYSTOLIC BLOOD PRESSURE: 144 MMHG | HEART RATE: 86 BPM | DIASTOLIC BLOOD PRESSURE: 92 MMHG | WEIGHT: 293 LBS | HEIGHT: 66 IN

## 2020-11-20 DIAGNOSIS — E66.01 MORBID OBESITY (HCC): Primary | ICD-10-CM

## 2020-11-20 DIAGNOSIS — K21.9 GASTROESOPHAGEAL REFLUX DISEASE WITHOUT ESOPHAGITIS: ICD-10-CM

## 2020-11-20 DIAGNOSIS — E66.01 MORBID OBESITY (HCC): ICD-10-CM

## 2020-11-20 PROCEDURE — 99215 OFFICE O/P EST HI 40 MIN: CPT | Performed by: SURGERY

## 2020-11-20 RX ORDER — LEVALBUTEROL TARTRATE 45 UG/1
AEROSOL, METERED ORAL
COMMUNITY
Start: 2020-09-10

## 2020-11-20 NOTE — PROGRESS NOTES
Gita Hernandez is a 52 y.o. female who presents today with   Chief Complaint   Patient presents with    Morbid Obesity     Revision Consult          Body mass index is 76.18 kg/m². 1. Have you been to the ER, urgent care clinic since your last visit? Hospitalized since your last visit? No    2. Have you seen or consulted any other health care providers outside of the [address] [place] [address] since your last visit? Include any pap smears or colon screening.  No

## 2020-11-20 NOTE — PROGRESS NOTES
Initial Consultation for Bariatric Surgery Template (Gastric band to bypass conversion)     Deanna Velez is a 52 y.o. female who comes into the office today for repeat consultation for the surgical options for the treatment of recurrent morbid obesity. She underwent Lap Banding in 2012 in [place]. She lost 50 lbs (385-320) with the band and was then diagnosed with \"ovarian and renal cancer. \" Her band was deflated for therapy. She states she is now cancer-free and wants her band removed and conerted to a sleeve gastrectomy. Her band has not been filled in any fashion since 2013. She has now had 2 years of attempts to manage the band in our office. During that time she has gained 20 lbs, most of it since the start of the pandemic when she started working from home. Today, the patient is  Height: 5' 6\" (167.6 cm) tall, Weight: (!) 168.7 kg (372 lb) lbs for a Body mass index is 60.04 kg/m². It is due to the patient's severe obesity, which is further complicated by hypertension and weight related arthopathies  that the patient is now seeking out bariatric surgery, specifically, sleeve gastrectomy.     Past Medical History:   Diagnosis Date    Anxiety     Arthritis     Hypertension     Ovarian cancer ([address]     Stage 2    Renal carcinoma ([address]     Right T2       Past Surgical History:   Procedure Laterality Date    ADJUSTMENT GASTRIC BAND N/A 05/29/2019    Rashaun Mantilla [facility]. TOTAL HYSTERECTOMY  2013    Ovarian CA    HX CHOLECYSTECTOMY  2017    HX GI  2012    Lap Band    NEPHRECTOMY Right     Partial right nephrectomy due to cancer       Current Outpatient Medications on File Prior to Visit   Medication Sig Dispense Refill    levalbuterol tartrate (XOPENEX) 45 mcg/actuation inhaler       losartan (COZAAR) 100 mg tablet TK 1 T PO  QD      hyoscyamine SL (LEVSIN/SL) 0.125 mg SL tablet DISSOLVE 1 TABLET UNDER THE TONGUE EVERY 6 HOURS AS NEEDED FOR CRAMPING 10 Tab 0    cetirizine (ZYRTEC) 10 mg tablet Take 10 mg by mouth.  EPINEPHrine (EPIPEN 2-KIERSTEN) 0.3 mg/0.3 mL injection 1 Cartridge.  furosemide (LASIX) 20 mg tablet Take 20 mg by mouth.  budesonide-formoterol (SYMBICORT) 160-4.5 mcg/actuation HFAA Take 2 Puffs by inhalation.  amLODIPine (NORVASC) 5 mg tablet Take 5 mg by mouth.  ALPRAZolam (XANAX) 1 mg tablet Take 1 mg by mouth DIALYSIS PRN.  albuterol (PROVENTIL HFA, VENTOLIN HFA, PROAIR HFA) 90 mcg/actuation inhaler Take 2 Puffs by inhalation.  acetaminophen (TYLENOL EXTRA STRENGTH) 500 mg tablet Take 500 mg by mouth.  montelukast (SINGULAIR) 10 mg tablet Take 10 mg by mouth.  ondansetron (ZOFRAN ODT) 4 mg disintegrating tablet Take 1 Tab by mouth every eight (8) hours as needed for Nausea or Nausea or Vomiting. 10 Tab 0    sucralfate (CARAFATE) 100 mg/mL suspension Take 10 mL by mouth four (4) times daily. 414 mL 1     No current facility-administered medications on file prior to visit.         Allergies   Allergen Reactions    Peanut Hives     Anaphylactic shock     Oxycodone Hives       Social History     Tobacco Use    Smoking status: Never Smoker    Smokeless tobacco: Never Used   Substance Use Topics    Alcohol use: No     Frequency: Never    Drug use: No       Family History   Problem Relation Age of Onset    No Known Problems Mother     Hypertension Father        Family Status   Relation Name Status    Mother  Alive    Father         Review of Systems:  Positive in BOLD    CONST: Fever, weight loss, fatigue or chills  GI: Nausea, vomiting, abdominal pain, change in bowel habits, hematochezia, melena, and GERD   INTEG: Dermatitis, abnormal moles  HEENT: Recent changes in vision, vertigo, epistaxis, dysphagia and hoarseness  CV: Chest pain, palpitations, HTN, edema and varicosities  RESP: Cough, shortness of breath, wheezing, hemoptysis, snoring and reactive airway disease  : Hematuria, dysuria, frequency, urgency, nocturia and stress urinary incontinence   MS: Weakness, joint pain and arthritis - left ankle and knee  ENDO: Diabetes, thyroid disease, polyuria, polydipsia, polyphagia, poor wound healing, heat intolerance, cold intolerance  LYMPH/HEME: Anemia, bruising and history of blood transfusions  NEURO: Dizziness, headache, fainting, seizures and stroke  PSYCH: Anxiety and depression      Physical Exam    Visit Vitals  BP (!) 144/92 (BP 1 Location: Left arm, BP Patient Position: At rest)   Pulse 86   Temp 96.8 °F (36 °C) (Oral)   Resp 20   Ht 5' 6\" (1.676 m)   Wt (!) 168.7 kg (372 lb)   BMI 60.04 kg/m²       Pre op weight: 372  EBW: 238  Wt loss to date: -100     General: 52 y.o.) female in no acute distress. Morbidly obese in sbdomen, hips and thighs - gynecoid pattern  HEENT: Normocephalic, atraumatic, Pupils equal and reactive, nasopharynx clear, oropharynx clear and moist without lesions  NECK: Supple, no lymphadenopathy, thyromegaly, carotid bruits or jugular venous distension. trachea midline  RESP: Clear to auscultation bilaterally, no wheezes, rhonchi, or rales, normal respiratory excursion  CV: Regular rate and rhythm, no murmurs, rubs or gallops. 3+/4 pulses in bilateral dorsalis pedis and posterior tibialis. No distal edema or varicosities. ABD: Soft, nontender, nondistended, normoactive bowel sounds, no hernias, no hepatosplenomegaly, minimally palpable costal margins, gynecoid distribution, healed long pfannenstiel incision - healed lap scars, port in LUQ. Extremities: Warm, well perfused, no tenderness or swelling, normal gait/station  Neuro: Sensation and strength grossly intact and symmetrical  Psych: Alert and oriented to person, place, and time.       Impression:    Cindy Garcia is a 52 y.o. female who is suffering from morbid obesity with a BMI of 60  and comorbidities including hypertension and weight related arthopathies  who would benefit from bariatric surgery.   We have had an extensive discussion with regard to the risks, benefits and likely outcomes of the operation. We've discussed the restrictive and malabsorptive nature of the gastric bypass and compared and contrasted with the sleeve gastrectomy. The patient understands the likelihood of losing approximately 40% of their excess weight in 12 to 18 months. She also understands the risks including but not limited to bleeding, infection, need for reoperation, ulcers, leaks (quoted 1.5%) and strictures, bowel obstruction secondary to adhesions and internal hernias, DVT, PE, heart attack, stroke, and death. Patient also understands risks of inadequate weight loss, excess weight loss, vitamin insufficiency, protein malnutrition, excess skin, and loss of hair. We have reviewed the components of a successful postoperative course including requirement for a high protein, low carbohydrate diet, 60 oz a day of zero calorie liquids, daily vitamin supplementation, daily exercise, regular follow-up, and participation in support groups. At this time we will enroll the patient in our bariatric program, undertake routine laboratory evaluation, chest X-ray, EKG, possible UGI and evaluation by  nutritionist as well as psychologist and pending their satisfactory completion of the preop evaluation, plan to perform a one stage conversion from band to bypass. She will probably need EGD  By me based on the UGI results.

## 2020-11-24 NOTE — PROGRESS NOTES
[facility]  [address], [address]    Patient's Name: Mason Lanes   Age: 52 y.o. YOB: 1970   Sex: female    Date:   11/25/2020   Insurance:  Vidhya Jiang          Session: 1 of  6  Surgeon:  Jose Van    Height: 66\" Weight:    377      Lbs. BMI: 60.8   Pounds Lost since last month: 0               Pounds Gained since last month: 5    Starting Weight: 372   Previous Months Weight: 372  Overall Pounds Lost: 0 Overall Pounds Gained: 5      Do you smoke? no    Alcohol intake:  0 Number of drinks at a time:  0  Number of times a week: 0    Class Guidelines    Guidelines are reviewed with patient at the start of every class. 1. Patient understands that weight loss trial classes must be consecutive. Patient understands if they miss a class, it is their responsibility to contact me to reschedule class. I will reach out to patient after their first no show. 2.  Patient understands the expectations that weight maintenance/weight loss is expected during the classes. Failure to demonstrate changes may result in one extra month of weight loss trial, followed by going back to see the surgeon. 3. Patient is also instructed to be doing their labs, blood work, psych visit, support group and any other test that the surgeon has used while they are working on their weight loss trial.    Other Pertinent Information:     Changes Made Since Last Class: less carbs      Dietary Instruction    During today's class we continued to focus on the key diet principles. Patient was instructed to follow a low carbohydrate diet, focusing on meat and vegetables. Patient was instructed to stop liquid calories and aim for 64 ounces of water per day. In class, I also gave patient a power point on surviving the holidays.   Some of the tips included survival tips for parties, including bringing their own low carbohydrate dish to a potluck dinner and surveying the buffet line before they start filling up their plate. Patient was given cooking alternatives, including using Splenda for sugar, substituting applesauce for oil in recipes, and using low fat plain yogurt instead of sour cream in dips. Patient was also encouraged to be mindful of calories in alcohol. Patient's diet habits include: 6 meals/day, mixture of different foods. Carb source is rice. Snacks 8x/day -- on fruit, crackers, bread, cookies. Patient is struggling to let go of pasta and bread. Fruit intake is grape and melons. Patient drinks water and seltzered beverages daily, unspecified amount. Physical Activity/Exercise    Patient is currently doing nothing for activity. Today's power point on surviving the holidays also included tips on exercising. This included being creative during the holiday, walking stairs, mall walking, getting resistance bands. Patient was encouraged not to be afraid to excuse themselves from the table to go for a walk after they eat. Comments: starting to walk    Behavior Modification    Reinforced behavior changes to make. Patient was encouraged to keep their emotions in check. Try to HALT and focus on whether they are eating out of hunger or if they are eating out of emotions. Other eating behaviors included surveying the buffet line before starting to fill up their plate. Patient was given a check off list and encouraged to monitor some of their eating behaviors, such as eating slowly, chewing their food thoroughly, and taking 20-30 minutes to eat a meal.    Goals that patient set for next month include:  Less carbs, more greens  Decreasing sweets and increase water intake  Increase physical acitivity     In today's class, patient learned about decreasing grazing on foods and decreasing bad eating habits.        Safia Duran, RD  11/25/2020

## 2020-11-25 ENCOUNTER — HOSPITAL ENCOUNTER (OUTPATIENT)
Dept: BARIATRICS/WEIGHT MGMT | Age: 50
Discharge: HOME | End: 2020-11-25

## 2020-12-07 ENCOUNTER — HOSPITAL ENCOUNTER (OUTPATIENT)
Dept: PREADMISSION TESTING | Age: 50
Discharge: HOME | End: 2020-12-07
Attending: SURGERY
Payer: COMMERCIAL

## 2020-12-07 ENCOUNTER — TRANSCRIBE ORDER (OUTPATIENT)
Dept: REGISTRATION | Age: 50
End: 2020-12-07

## 2020-12-07 ENCOUNTER — HOSPITAL ENCOUNTER (OUTPATIENT)
Dept: LAB | Age: 50
Discharge: HOME | End: 2020-12-07
Attending: SURGERY
Payer: COMMERCIAL

## 2020-12-07 ENCOUNTER — HOSPITAL ENCOUNTER (OUTPATIENT)
Dept: GENERAL RADIOLOGY | Age: 50
Discharge: HOME | End: 2020-12-07
Attending: SURGERY
Payer: COMMERCIAL

## 2020-12-07 DIAGNOSIS — E66.01 MORBID OBESITY (HCC): ICD-10-CM

## 2020-12-07 DIAGNOSIS — K21.9 ESOPHAGEAL REFLUX: ICD-10-CM

## 2020-12-07 DIAGNOSIS — K21.9 GASTROESOPHAGEAL REFLUX DISEASE WITHOUT ESOPHAGITIS: ICD-10-CM

## 2020-12-07 DIAGNOSIS — E66.01 MORBID OBESITY (HCC): Primary | ICD-10-CM

## 2020-12-07 LAB
25(OH)D3 SERPL-MCNC: 11.4 NG/ML (ref 30–100)
ALBUMIN SERPL-MCNC: 4.2 G/DL (ref 3.4–5)
ANION GAP SERPL CALC-SCNC: 7 MMOL/L (ref 3–18)
ATRIAL RATE: 68 BPM
BASOPHILS # BLD: 0 K/UL (ref 0–0.1)
BASOPHILS NFR BLD: 0 % (ref 0–2)
BUN SERPL-MCNC: 17 MG/DL (ref 7–18)
BUN/CREAT SERPL: 20 (ref 12–20)
CALCIUM SERPL-MCNC: 9.5 MG/DL (ref 8.5–10.1)
CALCULATED P AXIS, ECG09: 39 DEGREES
CALCULATED R AXIS, ECG10: -26 DEGREES
CHLORIDE SERPL-SCNC: 102 MMOL/L (ref 100–111)
CO2 SERPL-SCNC: 29 MMOL/L (ref 21–32)
CREAT SERPL-MCNC: 0.85 MG/DL (ref 0.6–1.3)
DIAGNOSIS, 93000: NORMAL
DIFFERENTIAL METHOD BLD: ABNORMAL
EOSINOPHIL # BLD: 0 K/UL (ref 0–0.4)
EOSINOPHIL NFR BLD: 0 % (ref 0–5)
ERYTHROCYTE [DISTWIDTH] IN BLOOD BY AUTOMATED COUNT: 12.3 % (ref 11.6–14.5)
FOLATE SERPL-MCNC: 9.7 NG/ML (ref 3.1–17.5)
GLUCOSE SERPL-MCNC: 87 MG/DL (ref 74–99)
HBA1C MFR BLD: 5.3 % (ref 4.2–5.6)
HCT VFR BLD AUTO: 48 % (ref 35–45)
HGB BLD-MCNC: 15.4 G/DL (ref 12–16)
IRON SERPL-MCNC: 81 UG/DL (ref 50–175)
LYMPHOCYTES # BLD: 3.6 K/UL (ref 0.9–3.6)
LYMPHOCYTES NFR BLD: 38 % (ref 21–52)
MCH RBC QN AUTO: 29.2 PG (ref 24–34)
MCHC RBC AUTO-ENTMCNC: 32.1 G/DL (ref 31–37)
MCV RBC AUTO: 90.9 FL (ref 74–97)
MONOCYTES # BLD: 0.6 K/UL (ref 0.05–1.2)
MONOCYTES NFR BLD: 6 % (ref 3–10)
NEUTS SEG # BLD: 5.3 K/UL (ref 1.8–8)
NEUTS SEG NFR BLD: 56 % (ref 40–73)
P-R INTERVAL, ECG05: 176 MS
PLATELET # BLD AUTO: 331 K/UL (ref 135–420)
PMV BLD AUTO: 10.3 FL (ref 9.2–11.8)
POTASSIUM SERPL-SCNC: 3.9 MMOL/L (ref 3.5–5.5)
Q-T INTERVAL, ECG07: 436 MS
QRS DURATION, ECG06: 120 MS
QTC CALCULATION (BEZET), ECG08: 463 MS
RBC # BLD AUTO: 5.28 M/UL (ref 4.2–5.3)
SODIUM SERPL-SCNC: 138 MMOL/L (ref 136–145)
TSH SERPL DL<=0.05 MIU/L-ACNC: 2.05 UIU/ML (ref 0.36–3.74)
VENTRICULAR RATE, ECG03: 68 BPM
VIT B12 SERPL-MCNC: 723 PG/ML (ref 211–911)
WBC # BLD AUTO: 9.5 K/UL (ref 4.6–13.2)

## 2020-12-07 PROCEDURE — 84443 ASSAY THYROID STIM HORMONE: CPT

## 2020-12-07 PROCEDURE — 85025 COMPLETE CBC W/AUTO DIFF WBC: CPT

## 2020-12-07 PROCEDURE — 74011000255 HC RX REV CODE- 255: Performed by: SURGERY

## 2020-12-07 PROCEDURE — 82040 ASSAY OF SERUM ALBUMIN: CPT

## 2020-12-07 PROCEDURE — 83540 ASSAY OF IRON: CPT

## 2020-12-07 PROCEDURE — 83036 HEMOGLOBIN GLYCOSYLATED A1C: CPT

## 2020-12-07 PROCEDURE — 86677 HELICOBACTER PYLORI ANTIBODY: CPT

## 2020-12-07 PROCEDURE — 80048 BASIC METABOLIC PNL TOTAL CA: CPT

## 2020-12-07 PROCEDURE — 74011000250 HC RX REV CODE- 250: Performed by: SURGERY

## 2020-12-07 PROCEDURE — 82607 VITAMIN B-12: CPT

## 2020-12-07 PROCEDURE — 84425 ASSAY OF VITAMIN B-1: CPT

## 2020-12-07 PROCEDURE — 74246 X-RAY XM UPR GI TRC 2CNTRST: CPT

## 2020-12-07 PROCEDURE — 36415 COLL VENOUS BLD VENIPUNCTURE: CPT

## 2020-12-07 PROCEDURE — 82306 VITAMIN D 25 HYDROXY: CPT

## 2020-12-07 PROCEDURE — 93005 ELECTROCARDIOGRAM TRACING: CPT

## 2020-12-07 RX ADMIN — BARIUM SULFATE 135 ML: 980 POWDER, FOR SUSPENSION ORAL at 08:02

## 2020-12-07 RX ADMIN — ANTACID/ANTIFLATULENT 4 G: 380; 550; 10; 10 GRANULE, EFFERVESCENT ORAL at 08:02

## 2020-12-07 RX ADMIN — BARIUM SULFATE 176 G: 960 POWDER, FOR SUSPENSION ORAL at 08:03

## 2020-12-08 LAB
H PYLORI IGA SER-ACNC: 14.1 UNITS (ref 0–8.9)
H PYLORI IGM SER-ACNC: 28.3 UNITS (ref 0–8.9)

## 2020-12-10 DIAGNOSIS — I10 HYPERTENSION, UNSPECIFIED TYPE: ICD-10-CM

## 2020-12-10 DIAGNOSIS — E66.01 MORBID OBESITY (HCC): Primary | ICD-10-CM

## 2020-12-10 DIAGNOSIS — R94.31 ABNORMAL EKG: ICD-10-CM

## 2020-12-11 ENCOUNTER — TELEPHONE (OUTPATIENT)
Dept: SURGERY | Age: 50
End: 2020-12-11

## 2020-12-11 DIAGNOSIS — A04.8 H. PYLORI INFECTION: Primary | ICD-10-CM

## 2020-12-11 LAB — VIT B1 BLD-SCNC: 147.4 NMOL/L (ref 66.5–200)

## 2020-12-11 RX ORDER — OMEPRAZOLE 20 MG/1
20 CAPSULE, DELAYED RELEASE ORAL 2 TIMES DAILY
Qty: 28 CAP | Refills: 0 | Status: SHIPPED | OUTPATIENT
Start: 2020-12-11 | End: 2020-12-25

## 2020-12-11 RX ORDER — FLUCONAZOLE 150 MG/1
150 TABLET ORAL DAILY
Qty: 1 TAB | Refills: 1 | Status: SHIPPED | OUTPATIENT
Start: 2020-12-11 | End: 2020-12-13

## 2020-12-11 RX ORDER — DOXYCYCLINE 100 MG/1
100 TABLET ORAL 2 TIMES DAILY
Qty: 28 TAB | Refills: 0 | Status: SHIPPED | OUTPATIENT
Start: 2020-12-11 | End: 2020-12-25

## 2020-12-11 RX ORDER — AMOXICILLIN 500 MG/1
500 CAPSULE ORAL 3 TIMES DAILY
Qty: 42 CAP | Refills: 0 | Status: SHIPPED | OUTPATIENT
Start: 2020-12-11 | End: 2020-12-25

## 2020-12-11 NOTE — TELEPHONE ENCOUNTER
Contacted NP, Vickie Earl and she approved medication Diflucan 150 mg to be sent to patients pharmacy along with the H Pylori treatment.   All meds sent to [facility] in patients chart

## 2020-12-11 NOTE — TELEPHONE ENCOUNTER
Contacted patient to notify of results per John OAKES. Patient confirmed pharmacy on file. Patient inquiring if she can have a rx for Diflucan due to she develops yeast infections from taking antibiotics. I advised patient I will notify John OAKES regarding New med request.  Patient verbalized understanding.      ----- Message from Sammie Barrientos NP sent at 12/9/2020  6:59 AM EST -----  Positive H. Pylori, please contact patient with results and prescribed treatment plan.

## 2020-12-14 ENCOUNTER — TELEPHONE (OUTPATIENT)
Dept: SURGERY | Age: 50
End: 2020-12-14

## 2020-12-14 NOTE — TELEPHONE ENCOUNTER
Patient LVM on nurse line stating medications that was rx'd for H Pylori were not at the pharmacy. I returned patient's call and she stated she was able to  meds from the pharmacy.

## 2020-12-21 ENCOUNTER — HOSPITAL ENCOUNTER (OUTPATIENT)
Dept: BARIATRICS/WEIGHT MGMT | Age: 50
Discharge: HOME | End: 2020-12-21

## 2020-12-21 NOTE — PROGRESS NOTES
[facility]  [facility], [address]    Patient's Name: Colton Ahn   Age: 52 y.o. YOB: 1970   Sex: female    Date:   12/21/2020    Insurance:  Nisreen Newton          Session: 2 of  6  Surgeon:  Ronak Torrez    Height: 66\" Weight:    372      Lbs. BMI: 60.0    Previous Month's Weight: 377 lbs   Pounds Lost since last month: 5                 Pounds Gained since last month: 0    Starting Weight: 372 lbs       Overall Pounds Lost: 0   Overall Pounds Gained: 0      Do you smoke? No    Alcohol intake:  None. Number of drinks at a time:  0  Number of times a week: 0    Class Guidelines    Guidelines are reviewed with patient at the start of every class. 1. Patient understands that weight loss trial classes must be consecutive. Patient understands if they miss a class, it is their responsibility to contact me to reschedule class. I will reach out to patient after their first no show. 2.  Patient understands the expectations that weight maintenance/weight loss is expected during the classes. Failure to demonstrate changes may result in one extra month of weight loss trial, followed by going back to see the surgeon. 3. Patient is also instructed to be doing their labs, blood work, psych visit, support group and any other test that the surgeon has used while they are working on their weight loss trial.      Changes Made Since Last Class: Changed intake and eating habits. Dietary Instruction    During today's class we continued to focus on the key diet principles. Patient was instructed to follow a low carbohydrate diet, focusing on meat and vegetables. Patient was instructed to stop liquid calories and aim for 64 ounces of water per day. In class, I also gave patient a power point on increasing protein and making healthy dietary changes and swaps.   Some of the tips included high protein-based snacks and meals, incorporating protein at every meal and snack, swapping out deep-fried or breaded foods for grilled, air-fried, broiled, steamed or baked alternatives. Patient was given cooking alternatives, including low carb options for marinades and seasonings, and non-starchy vegetables. Patient's diet habits include: Ounces of fluids/day: 1 cup of coffee, 2 cups of tea, and 40 oz of water  How many meals daily? 5  What meals consist of --  Protein veg and carb  Carb sources: potatoes, rice, fruit, bread, sugary products  Food struggling to let go: bread, cookies  Snacks: crackers, cookies, fruit    Physical Activity/Exercise    Patient is currently doing nothing for activity. Today's power point was on introducing exercise and movement in increments. This included being creative -- finding small exercise videos through YouTube or signing up for workout Apps, trying chair or couch exercises. Patient was continually encouraged to park further away when shopping, taking the stairs over the elevator or escalator and incorporating small weights into movement. Patient was encouraged start with small changes and work their way up to their exercise goals. Comments: has a bad hip in which she is receiving physical therapy for. Behavior Modification    In today's class, we reinforced and focused on behavior changes to make. Patient was encouraged to plan their meals and take time to pack a lunch or breakfast to stay on track. Patient was continually encouraged to monitor some of their eating behaviors, such as limiting their distractions while eating, not skipping meals, practice mindful eating and listening to their physical hunger and fullness cues. Patient was also encouraged to track food intake, specifically carbohydrates and proteins through a food journal or a food tracking becky.      Goals that patient set for next month include:  Less carbs, less sugars, no eating in front of electronics  Walking dog 2x/day to ensure she gets a walk in    Patient learned the importance of mindful eating in today's class.       Mónica Cortez, NYA  12/21/2020

## 2020-12-23 ENCOUNTER — TELEPHONE (OUTPATIENT)
Dept: SURGERY | Age: 50
End: 2020-12-23

## 2020-12-23 NOTE — TELEPHONE ENCOUNTER
----- Message from Jeramy Villalba NP sent at 12/8/2020  7:09 AM EST -----  Very low vitamin d level. Please advise patient to start  D3 10,000 iu daily  Spoke with pt about Vit d supplementation.

## 2021-01-01 NOTE — PROGRESS NOTES
Referral to cardiology for abnormal EKG, Patient notified and provided with cardiology contact number.
O positive

## 2021-01-06 ENCOUNTER — OFFICE VISIT (OUTPATIENT)
Dept: CARDIOLOGY CLINIC | Age: 51
End: 2021-01-06
Payer: COMMERCIAL

## 2021-01-06 VITALS
SYSTOLIC BLOOD PRESSURE: 109 MMHG | HEIGHT: 66 IN | DIASTOLIC BLOOD PRESSURE: 67 MMHG | TEMPERATURE: 97.7 F | HEART RATE: 96 BPM | BODY MASS INDEX: 47.09 KG/M2 | RESPIRATION RATE: 16 BRPM | WEIGHT: 293 LBS | OXYGEN SATURATION: 98 %

## 2021-01-06 DIAGNOSIS — Z01.818 PREOPERATIVE CLEARANCE: ICD-10-CM

## 2021-01-06 DIAGNOSIS — I47.1 SVT (SUPRAVENTRICULAR TACHYCARDIA) (HCC): ICD-10-CM

## 2021-01-06 DIAGNOSIS — R94.31 ABNORMAL ECG: ICD-10-CM

## 2021-01-06 DIAGNOSIS — Z01.818 PRE-OP TESTING: ICD-10-CM

## 2021-01-06 DIAGNOSIS — R00.0 TACHYCARDIA: Primary | ICD-10-CM

## 2021-01-06 DIAGNOSIS — E66.01 OBESITY, MORBID (HCC): ICD-10-CM

## 2021-01-06 DIAGNOSIS — Z98.890 HISTORY OF CARDIAC CATHETERIZATION: ICD-10-CM

## 2021-01-06 PROCEDURE — 99204 OFFICE O/P NEW MOD 45 MIN: CPT | Performed by: INTERNAL MEDICINE

## 2021-01-06 RX ORDER — METOPROLOL SUCCINATE 25 MG/1
TABLET, EXTENDED RELEASE ORAL DAILY
COMMUNITY
End: 2021-01-06 | Stop reason: SDUPTHER

## 2021-01-06 NOTE — PROGRESS NOTES
Shivam Dominguez presents today for   Chief Complaint   Patient presents with    New Patient     Surgical Clearance       Shivam Dominguez preferred language for health care discussion is english/other. Personal Protective Equipment:   Personal Protective Equipment was used including: mask-surgical and hands-gloves. Patient was placed on no precaution(s). Patient was masked. Is someone accompanying this pt? No    Is the patient using any DME equipment during OV? No    Depression Screening:  3 most recent PHQ Screens 5/17/2019   Little interest or pleasure in doing things Not at all   Feeling down, depressed, irritable, or hopeless Not at all   Total Score PHQ 2 0       Learning Assessment:  Learning Assessment 5/17/2019   PRIMARY LEARNER Patient   PRIMARY LANGUAGE ENGLISH   LEARNER PREFERENCE PRIMARY DEMONSTRATION     VIDEOS     LISTENING   ANSWERED BY patient   RELATIONSHIP SELF       Abuse Screening:  Abuse Screening Questionnaire 5/17/2019   Do you ever feel afraid of your partner? N   Are you in a relationship with someone who physically or mentally threatens you? N   Is it safe for you to go home? Y       Fall Risk  No flowsheet data found. Pt currently taking Anticoagulant therapy? No    Coordination of Care:  1. Have you been to the ER, urgent care clinic since your last visit? Hospitalized since your last visit? N/A    2. Have you seen or consulted any other health care providers outside of the [address] [place] [address] since your last visit? Include any pap smears or colon screening.  N/A

## 2021-01-06 NOTE — TELEPHONE ENCOUNTER
PCP: ANDREY Kiser    Last appt: 1/6/2021  Future Appointments   Date Time Provider Shree Lin   1/14/2021 11:00 AM Steffi Chang NP [facility]B   1/18/2021  1:00 PM [facility] DIETICIAN [facility]       Requested Prescriptions     Pending Prescriptions Disp Refills    metoprolol succinate (TOPROL-XL) 25 mg XL tablet 90 Tab 3     Sig: Take 1 Tab by mouth daily.

## 2021-01-06 NOTE — PATIENT INSTRUCTIONS
-- Cristine Mckenzie (Self) [phone] (M)          Alternative phone number: None    Turnaround time given to caller:   \"This message will be sent to [state Provider's name]. The clinical team will fulfill your request as soon as they review your message when the office opens tomorrow.\"     Testing Echo Please call [facility] scheduling at [phone]  to schedule an appointment. All testing is completed at [address] [facility] **call office 5 days after testing is completed for results**

## 2021-01-07 RX ORDER — METOPROLOL SUCCINATE 25 MG/1
25 TABLET, EXTENDED RELEASE ORAL DAILY
Qty: 90 TAB | Refills: 3 | Status: SHIPPED | OUTPATIENT
Start: 2021-01-07 | End: 2022-01-21

## 2021-01-13 PROBLEM — Z98.890 HISTORY OF CARDIAC CATHETERIZATION: Status: ACTIVE | Noted: 2021-01-13

## 2021-01-13 PROBLEM — I47.1 SVT (SUPRAVENTRICULAR TACHYCARDIA) (HCC): Status: ACTIVE | Noted: 2021-01-13

## 2021-01-13 PROBLEM — Z01.818 PREOPERATIVE CLEARANCE: Status: ACTIVE | Noted: 2021-01-13

## 2021-01-13 PROBLEM — R94.31 ABNORMAL ECG: Status: ACTIVE | Noted: 2021-01-13

## 2021-01-18 ENCOUNTER — HOSPITAL ENCOUNTER (OUTPATIENT)
Dept: BARIATRICS/WEIGHT MGMT | Age: 51
Discharge: HOME | End: 2021-01-18

## 2021-01-18 NOTE — PROGRESS NOTES
[facility]  [facility], [address]    Patient's Name: Deirdre Parada   Age: 52 y.o. YOB: 1970   Sex: female    Date:   1/18/2021    Insurance:  Lucia Luu          Session: 3 of 6  Surgeon:  Bree Bailon    Height: 66\" Weight:    370    Lbs. BMI: 59.7     Previous Month's Weight: 372 lbs  Pounds Lost since last month: 1                 Pounds Gained since last month: 0    Starting Weight: 372 lbs     Overall Pounds Lost: 1   Overall Pounds Gained: 0      Do you smoke? Patient does not smoke. Alcohol intake: socially  Number of drinks at a time:  2  Number of times a week: a few times/month    Class Guidelines    Guidelines are reviewed with patient at the start of every class. 1. Patient understands that weight loss trial classes must be consecutive. Patient understands if they miss a class, it is their responsibility to contact me to reschedule class. I will reach out to patient after their first no show. 2.  Patient understands the expectations that weight maintenance/weight loss is expected during the classes. Failure to demonstrate changes may result in one extra month of weight loss trial, followed by going back to see the surgeon. 3. Patient is also instructed to be doing their labs, blood work, psych visit, support group and any other test that the surgeon has used while they are working on their weight loss trial.    Changes Made: No soda or juice; water and hot tea w/o honey      Dietary Instruction    During today's class we continued to focus on the key diet principles. Patient was instructed to follow a low carbohydrate diet, focusing on meat and vegetables. Patient was instructed to stop liquid calories and aim for 64 ounces of water per day.  We focused on focusing in on bigger problem areas to start making changes to, such as reducing fast food intake, reducing carbonated beverages/soda intake, decreasing carbohydrates intake daily, etc. We reviewed protein shakes and high protein yogurts to chose, as well. Patient's diet habits include: Patient eating 3 meals/day, containing a protein, vegetable and carb source. Carbs come from rice, pasta, cookies, bread or fruit. Patient reports snacking 6x/day, containing fruit, yogurt, or a non-starchy vegetable. Patient is finding it difficult to let go of pasta, bread and cookies. Drinking water and hot tea daily, no specified amount given. Physical Activity/Exercise    Comments:     Currently for exercise, patient is walking dog for 15 mins/day. We talked about activities for patient to do, including walking, swimming, or chair exercises. Behavior Modification  Comments:   During class, I reviewed a power point with patients called, \"Assessing Your Readiness to Change. \"  During this power point, patient was asked to self-evaluate themselves. At the end, we tallied the scores to determine how ready they are to make changes for the surgery. For the New Year's, I had patient set New Year's resolutions, including a food-related goal, exercise-related goal, and behavior goal.  Patient was encouraged to track the goals on a daily basis using the check off list I provided. Goals should be SMART, specific, measurable, attainable, realistic, and time-orientated. Patient's Goals are:  1. Behavior-Related Goal: Less stress eating, finding other outlets, like cleaning   2. Food-related goal: Eat less carbs daily and stop the comfort eating  3. Exercise-related goal: Move more, walk dog more. Patient learned the importance of setting SMART goals.        Nicko Ingram, NYA  1/18/2021

## 2021-01-21 ENCOUNTER — HOSPITAL ENCOUNTER (OUTPATIENT)
Dept: NON INVASIVE DIAGNOSTICS | Age: 51
Discharge: HOME | End: 2021-01-21
Attending: INTERNAL MEDICINE
Payer: COMMERCIAL

## 2021-01-21 VITALS
DIASTOLIC BLOOD PRESSURE: 67 MMHG | WEIGHT: 293 LBS | BODY MASS INDEX: 47.09 KG/M2 | HEIGHT: 66 IN | SYSTOLIC BLOOD PRESSURE: 109 MMHG

## 2021-01-21 DIAGNOSIS — Z01.818 PRE-OP TESTING: ICD-10-CM

## 2021-01-21 DIAGNOSIS — R00.0 TACHYCARDIA: ICD-10-CM

## 2021-01-21 LAB
ECHO AO ARCH DIAM: 3.26 CM
ECHO AO ASC DIAM: 3.64 CM
ECHO AO ROOT DIAM: 3.48 CM
ECHO IVC PROX: 1.08 CM
ECHO IVC SNIFF: 1.08 CM
ECHO LA AREA 2C: 21.79 CM2
ECHO LA AREA 4C: 24.4 CM2
ECHO LA MAJOR AXIS: 4.83 CM
ECHO LA MINOR AXIS: 1.86 CM
ECHO LA TO AORTIC ROOT RATIO: 1.39
ECHO LA VOL 2C: 59.06 ML (ref 22–52)
ECHO LA VOL 4C: 80.48 ML (ref 22–52)
ECHO LA VOL BP: 74.35 ML (ref 22–52)
ECHO LA VOL/BSA BIPLANE: 28.62 ML/M2 (ref 16–28)
ECHO LA VOLUME INDEX A2C: 22.73 ML/M2 (ref 16–28)
ECHO LA VOLUME INDEX A4C: 30.98 ML/M2 (ref 16–28)
ECHO LV E' LATERAL VELOCITY: 9.63 CM/S
ECHO LV E' SEPTAL VELOCITY: 5.93 CM/S
ECHO LV EDV A2C: 96.1 ML
ECHO LV EDV A4C: 83 ML
ECHO LV EDV BP: 90.1 ML (ref 56–104)
ECHO LV EDV INDEX A4C: 31.9 ML/M2
ECHO LV EDV INDEX BP: 34.7 ML/M2
ECHO LV EDV NDEX A2C: 37 ML/M2
ECHO LV EDV TEICHHOLZ: 0.85 ML
ECHO LV EJECTION FRACTION A2C: 52 %
ECHO LV EJECTION FRACTION A4C: 43 %
ECHO LV EJECTION FRACTION BIPLANE: 47.1 % (ref 55–100)
ECHO LV ESV A2C: 46.1 ML
ECHO LV ESV A4C: 47.1 ML
ECHO LV ESV BP: 47.7 ML (ref 19–49)
ECHO LV ESV INDEX A2C: 17.7 ML/M2
ECHO LV ESV INDEX A4C: 18.1 ML/M2
ECHO LV ESV INDEX BP: 18.4 ML/M2
ECHO LV ESV TEICHHOLZ: 0.42 ML
ECHO LV INTERNAL DIMENSION DIASTOLIC: 5.42 CM (ref 3.9–5.3)
ECHO LV INTERNAL DIMENSION SYSTOLIC: 4.02 CM
ECHO LV IVSD: 0.87 CM (ref 0.6–0.9)
ECHO LV MASS 2D: 181.3 G (ref 67–162)
ECHO LV MASS INDEX 2D: 69.8 G/M2 (ref 43–95)
ECHO LV POSTERIOR WALL DIASTOLIC: 0.93 CM (ref 0.6–0.9)
ECHO LVOT DIAM: 2.08 CM
ECHO LVOT PEAK GRADIENT: 3.87 MMHG
ECHO LVOT SV: 70.1 ML
ECHO LVOT SV: 70.1 ML
ECHO LVOT VTI: 20.57 CM
ECHO MV A VELOCITY: 66.34 CM/S
ECHO MV E DECELERATION TIME (DT): 112.7 MS
ECHO MV E VELOCITY: 60.57 CM/S
ECHO MV E/A RATIO: 0.91
ECHO MV E/E' LATERAL: 6.29
ECHO MV E/E' RATIO (AVERAGED): 8.25
ECHO MV E/E' SEPTAL: 10.21
ECHO PV REGURGITANT MAX VELOCITY: 192.19 CM/S
ECHO RA MINOR AXIS: 4.04 CM
ECHO RV TAPSE: 2.42 CM (ref 1.5–2)
ECHO TV REGURGITANT MAX VELOCITY: 192.19 CM/S
ECHO TV REGURGITANT PEAK GRADIENT: 14.8 MMHG
LVFS 2D: 25.82 %
LVOT MG: 2.01 MMHG
LVOT MV: 0.66 CM/S
LVSV (MOD BI): 15.17 ML
LVSV (MOD SINGLE 4C): 12.83 ML
LVSV (MOD SINGLE): 17.9 ML
LVSV (TEICH): 25.61 ML
MV DEC SLOPE: 5.38

## 2021-01-21 PROCEDURE — 93306 TTE W/DOPPLER COMPLETE: CPT

## 2021-02-17 ENCOUNTER — HOSPITAL ENCOUNTER (OUTPATIENT)
Dept: BARIATRICS/WEIGHT MGMT | Age: 51
Discharge: HOME | End: 2021-02-17

## 2021-02-17 NOTE — PROGRESS NOTES
[facility]  [facility], [address]    Patient's Name: Juan C Reeves   Age: 52 y.o. YOB: 1970   Sex: female    Date:   2/17/2021    Session: 4 of  6  Surgeon:  Dimitri Becerra    Height: 66\" Weight:    368      Lbs  BMI: 59.3     Previous Month's Weight: 370  Pounds Lost since last month: 2                 Pounds Gained since last month: 0    Starting Weight: 372     Overall Pounds Lost: 4   Overall Pounds Gained: 0      Do you smoke? Patient does not smoke    Alcohol intake: Patient does not drink  Number of drinks at a time:  0  Number of times a week: 0    Class Guidelines    Guidelines are reviewed with patient at the start of every class. 1. Patient understands that weight loss trial classes must be consecutive. Patient understands if they miss a class, it is their responsibility to contact me to reschedule class. I will reach out to patient after their first no show. 2.  Patient understands the expectations that weight maintenance/weight loss is expected during the classes. Failure to demonstrate changes may result in one extra month of weight loss trial, followed by going back to see the surgeon. 3. Patient is also instructed to be doing their labs, blood work, psych visit, support group and any other test that the surgeon has used while they are working on their weight loss trial.    Changes Made: Trying to cut down carbs and less bingeing      Dietary Instruction    During today's class we continued to focus on the key diet principles. Patient was instructed to follow a low carbohydrate diet, focusing on meat and vegetables. Patient was instructed to stop liquid calories and aim for 64 ounces of water per day.  We focused on focusing in on bigger problem areas to start making changes to, such as reducing fast food intake, reducing carbonated beverages/soda intake, decreasing carbohydrates intake daily, etc. We reviewed protein shakes and high protein yogurts to chose, as well. Patient was educated on low sugar swaps for beverages, as well as low carb swaps for every day high carb meals. Patient's diet habits include: Patient report she eats a mixture of 6 meals/snacks daily. Carb sources come from rice, bread, pasta, bread, fruit, and cookies. Reports she has been eating a lot of fruit daily. Drinking 50 oz water and 16 oz of tea w/ honey. Physical Activity/Exercise    Comments:     Currently for exercise, patient is not doing anything. Patient reports she recently twisted her ankle and it is healing. We talked about activities for patient to do, including walking, swimming, or chair exercises, as well as some additional steps to take in the day to get extra movement, such as parking further away, walking dog, taking stairs vs elevator, etc.        Behavior Modification  Comments:   During class, I reviewed a power point with patients called, \"How to Read a Nutrition Label and Understanding Carbohydrates. \" During this power point, the patient was educated on reading nutrition labels and ingredients list and how to find low sugar/low fat products when shopping, as well as recognizing different sources of carbohydrates in foods. The patient was encouraged to try low carb food swaps that were discussed in the power point. We explored various ways of preparing and cooking low carb meals to help reach the goal of decreasing daily carbohydrate consumption. Patient's S.M.A.R.T. Goals are:  1. Cut carbs, smaller portions  2. Walk 1 miles daily  3.  Less stress eating and find other outlets      Jethro Yousif RD  2/17/2021

## 2021-02-25 ENCOUNTER — OFFICE VISIT (OUTPATIENT)
Dept: SURGERY | Age: 51
End: 2021-02-25
Payer: COMMERCIAL

## 2021-02-25 VITALS
RESPIRATION RATE: 18 BRPM | OXYGEN SATURATION: 98 % | WEIGHT: 293 LBS | HEART RATE: 72 BPM | TEMPERATURE: 97.8 F | SYSTOLIC BLOOD PRESSURE: 112 MMHG | BODY MASS INDEX: 47.09 KG/M2 | HEIGHT: 66 IN | DIASTOLIC BLOOD PRESSURE: 65 MMHG

## 2021-02-25 DIAGNOSIS — Z98.84 LAP-BAND SURGERY STATUS: ICD-10-CM

## 2021-02-25 DIAGNOSIS — E66.01 OBESITY, MORBID (HCC): Primary | ICD-10-CM

## 2021-02-25 DIAGNOSIS — A04.8 POSITIVE H. PYLORI TEST: ICD-10-CM

## 2021-02-25 DIAGNOSIS — E55.9 VITAMIN D DEFICIENCY: ICD-10-CM

## 2021-02-25 PROCEDURE — 99213 OFFICE O/P EST LOW 20 MIN: CPT | Performed by: NURSE PRACTITIONER

## 2021-02-25 NOTE — Clinical Note
Band to bypass finishing in April, not sure if you ever got to see her UGI? Elfreda Section said okay for bypass but I was unsure if that was coming from you? Looks normal, still want an EGD?

## 2021-02-25 NOTE — PROGRESS NOTES
Bariatric Preoperative Progress Note      Subjective:     Juan C Reeves is a 52 y.o. female who presents today for followup of their candidacy for bariatric surgery. Since last seen, Juan C Reeves has been working through bariatric program towards band to bypass conversion. Past Medical History:   Diagnosis Date    Anxiety     Arthritis     Hypertension     Ovarian cancer ([address]     Stage 2    Renal carcinoma ([facility] [address]     Right T2       Past Surgical History:   Procedure Laterality Date    ADJUSTMENT GASTRIC BAND N/A 05/29/2019    ANDREY Anderson    [facility]. TOTAL HYSTERECTOMY  2013    Ovarian CA    HX CHOLECYSTECTOMY  2017    HX GI  2012    Lap Band    NEPHRECTOMY Right     Partial right nephrectomy due to cancer       Current Outpatient Medications   Medication Sig Dispense Refill    metoprolol succinate (TOPROL-XL) 25 mg XL tablet Take 1 Tab by mouth daily. 90 Tab 3    levalbuterol tartrate (XOPENEX) 45 mcg/actuation inhaler       losartan (COZAAR) 100 mg tablet TK 1 T PO  QD      hyoscyamine SL (LEVSIN/SL) 0.125 mg SL tablet DISSOLVE 1 TABLET UNDER THE TONGUE EVERY 6 HOURS AS NEEDED FOR CRAMPING 10 Tab 0    cetirizine (ZYRTEC) 10 mg tablet Take 10 mg by mouth.  EPINEPHrine (EPIPEN 2-KIERSTEN) 0.3 mg/0.3 mL injection 1 Cartridge.  furosemide (LASIX) 20 mg tablet Take 20 mg by mouth.  amLODIPine (NORVASC) 5 mg tablet Take 5 mg by mouth.  montelukast (SINGULAIR) 10 mg tablet Take 10 mg by mouth.  budesonide-formoterol (SYMBICORT) 160-4.5 mcg/actuation HFAA Take 2 Puffs by inhalation.  ALPRAZolam (XANAX) 1 mg tablet Take 1 mg by mouth DIALYSIS PRN.  albuterol (PROVENTIL HFA, VENTOLIN HFA, PROAIR HFA) 90 mcg/actuation inhaler Take 2 Puffs by inhalation.  acetaminophen (TYLENOL EXTRA STRENGTH) 500 mg tablet Take 500 mg by mouth.          Allergies   Allergen Reactions    Peanut Hives     Anaphylactic shock     Oxycodone Hives       Review of Systems:  Positive in BOLD     CONST: Fever, weight loss, fatigue or chills  GI: Nausea, vomiting, abdominal pain, change in bowel habits, hematochezia, melena, and GERD   INTEG: Dermatitis, abnormal moles  HEENT: Recent changes in vision, vertigo, epistaxis, dysphagia and hoarseness  CV: Chest pain, palpitations, HTN, edema and varicosities  RESP: Cough, shortness of breath, wheezing, hemoptysis, snoring and reactive airway disease  : Hematuria, dysuria, frequency, urgency, nocturia and stress urinary incontinence   MS: Weakness, joint pain and arthritis - left ankle and knee  ENDO: Diabetes, thyroid disease, polyuria, polydipsia, polyphagia, poor wound healing, heat intolerance, cold intolerance  LYMPH/HEME: Anemia, bruising and history of blood transfusions  NEURO: Dizziness, headache, fainting, seizures and stroke  PSYCH: Anxiety and depression     Objective:     Physical Exam:  Visit Vitals  /65   Pulse 72   Temp 97.8 °F (36.6 °C)   Resp 18   Ht 5' 6\" (1.676 m)   Wt (!) 165.6 kg (365 lb)   SpO2 98%   BMI 58.91 kg/m²       Physical Exam  Vitals signs and nursing note reviewed. Constitutional:       Appearance: She is obese. HENT:      Head: Normocephalic and atraumatic. Eyes:      Pupils: Pupils are equal, round, and reactive to light. Cardiovascular:      Rate and Rhythm: Normal rate. Pulmonary:      Effort: Pulmonary effort is normal.   Musculoskeletal: Normal range of motion. Skin:     General: Skin is warm and dry. Neurological:      General: No focal deficit present. Mental Status: She is alert and oriented to person, place, and time.    Psychiatric:         Mood and Affect: Mood and affect normal.         Behavior: Behavior normal.         Studies to date:    Labs: significant for Vit D def, H pylori     EKG: Normal sinus rhythm   Septal infarct , age undetermined Possible   Abnormal ECG   No previous ECGs available    Nutritional evaluation: 4/6    Psychiatric evaluation: pending     Support Group: attended     Additional evaluations:  CV clearance: pending    UGI: IMPRESSION:  1. Spontaneous gastroesophageal reflux observed on this study. 2.  Otherwise normal upper GI series. 3.  Gastric band appears appropriately positioned. EGD: pending review of UGI     Assessment:   Victor M Tee is a 52 y.o. female who is progressing through the bariatric preoperative evaluation. Plan:   -complete remainder of preop evaluation   - patient communicates understanding that the expectation is to lose or maintain weight during WLT. Weight gain may result in delay or cancellation of surgery.   -Follow up once has completed entirety of weight loss workup to determine next steps.       Vitamin D deficiency: Start Vit D 3 5000 units daily PO  H pylori treated   Niurka David, HUNGP-BC

## 2021-02-25 NOTE — PROGRESS NOTES
Rocky Osei is a 52 y.o. female  Chief Complaint   Patient presents with    Weight Management     mid trial 9/2012 gsdtric bsnd. revided in 2019     Pt ID confirmed    Weight Loss Metrics 2/25/2021 1/21/2021 1/6/2021 11/20/2020 11/20/2020 9/30/2020 7/21/2020   Pre op / Initial Wt - - - 372 - 385 -   Today's Wt 365 lb 370 lb 370 lb 9.6 oz - 372 lb 355 lb 359 lb 14.4 oz   BMI 58.91 kg/m2 59.72 kg/m2 59.82 kg/m2 - 60.04 kg/m2 57.3 kg/m2 58.09 kg/m2   Ideal Body Wt - - - 134 - 134 -   Excess Body Wt - - - 238 - 251 -   Goal Wt - - - 182 - 178 -   Wt loss to date - - - -100 - 30 -   % Wt Loss - - - -0.53 - 0.15 -   80% EBW - - - 190.4 - 200.8 -       Body mass index is 58.91 kg/m².

## 2021-02-25 NOTE — Clinical Note
Band to Bypass started with Carolyn Steele, finishes in April, UGI looking pretty normal, he was planning possible EGD. If she stays with us do you want one?

## 2021-03-17 ENCOUNTER — HOSPITAL ENCOUNTER (OUTPATIENT)
Dept: BARIATRICS/WEIGHT MGMT | Age: 51
Discharge: HOME | End: 2021-03-17

## 2021-03-17 NOTE — PROGRESS NOTES
[facility]  [facility], [address]    Patient's Name: Maribeth Wahl   Age: 52 y.o. YOB: 1970   Sex: female    Date:   3/17/2021    Session: 5 of  6  Surgeon:  Scarlet Prader    Height: 66\" Weight:    365      Lbs   BMI: 58.9     Previous Month's Weight: 268  Pounds Lost since last month: 3                 Pounds Gained since last month: 0    Starting Weight: 372     Overall Pounds Lost: 7   Overall Pounds Gained: 0      Do you smoke? Patient does not smoke    Alcohol intake: Patient does not drink  Number of drinks at a time:  0  Number of times a week: 0    Class Guidelines    Guidelines are reviewed with patient at the start of every class. 1. Patient understands that weight loss trial classes must be consecutive. Patient understands if they miss a class, it is their responsibility to contact me to reschedule class. I will reach out to patient after their first no show. 2.  Patient understands the expectations that weight maintenance/weight loss is expected during the classes. Failure to demonstrate changes may result in one extra month of weight loss trial, followed by going back to see the surgeon. 3. Patient is also instructed to be doing their labs, blood work, psych visit, support group and any other test that the surgeon has used while they are working on their weight loss trial.    Changes Made: Cutting down carbs and doing less bingeing      Dietary Instruction    During today's class, we continued to focus on the key diet principles. Patient was instructed to follow a low carbohydrate diet, focusing on meat and vegetables. Patient was instructed to stop liquid calories and aim for 64 ounces of water per day.  We focused on focusing in on bigger problem areas to start making changes to, such as reducing fast food intake, reducing carbonated beverages/soda intake, decreasing carbohydrates intake daily, etc. We reviewed protein shakes and high protein yogurts to chose, as well. Patient was educated on good breakfast ideas and high protein snacks. I also reviewed with patient the vitamins that they will need to take post op. Patient will hear this information again at pre op class prior to surgery, but I felt it was important to prepare them now. Patient will be taking 2 multivitamin complete per day, 100 mg of Vitamin B1, 5000 IU of Vitamin D3, 1000 mcg Vitamin B12, 1500 mg of calcium citrate. Patient's diet habits include: Eating 3 meals daily, consisting of a mixture of different types of foods. Carb sources come from fruit, crackers, cookies, bread, and pasta. Snacking 6x. day on fruit, crackers, and cookies. Struggling to let go of foods that are high in carbs. Drinking 50 oz of water and 16 oz of hot tea w/ honey daily. Physical Activity/Exercise    Comments:     Currently for exercise, patient is Walking consistently throughout the week and has been reaching her goal of 6000 steps daily for the past 2 weeks! We talked about activities for patient to do, including walking, swimming, or chair exercises, as well as some additional steps to take in the day to get extra movement, such as parking further away, walking dog, taking stairs vs elevator, etc. Patient was encouraged to start up an exercise routine and build on it. Behavior Modification  Comments:   Emphasized the importance of eating slowly, not eating and drinking meals at the same time. Discussed Key Behavior principles to start implementing to be successful following surgery, such as, importance of 3 meals daily, keeping a food journal, avoid distractions during meal times, and chewing food thoroughly, as a few examples. Patient's S.M.A.R.T. Goals are:  1. Less carbs! 2. A milk walking daily  3.  Less attachment to foods      Sharon Garrido, RD  3/17/2021

## 2021-04-15 ENCOUNTER — DOCUMENTATION ONLY (OUTPATIENT)
Dept: BARIATRICS/WEIGHT MGMT | Age: 51
End: 2021-04-15

## 2021-04-15 ENCOUNTER — HOSPITAL ENCOUNTER (OUTPATIENT)
Dept: BARIATRICS/WEIGHT MGMT | Age: 51
Discharge: HOME | End: 2021-04-15

## 2021-04-15 NOTE — PROGRESS NOTES
[employer] [facility]  [facility], [address]    Patient's Name: Nevaeh Delacruz   Age: 52 y.o. YOB: 1970   Sex: female    Date:   4/15/2021    Session: 6 of  6  Surgeon:  Michelle Cobian    Height: 66\" Weight:    362.6      Lbs  BMI: 58.5     Previous Month's Weight: 365  Pounds Lost since last month: 3                 Pounds Gained since last month: 0    Starting Weight: 372     Overall Pounds Lost: 10   Overall Pounds Gained: 0      Do you smoke? Pt does not smoke    Alcohol intake: Pt  Does not drink  Number of drinks at a time:  0  Number of times a week: 0    Class Guidelines    Guidelines are reviewed with patient at the start of every class. 1. Patient understands that weight loss trial classes must be consecutive. Patient understands if they miss a class, it is their responsibility to contact me to reschedule class. I will reach out to patient after their first no show. 2.  Patient understands the expectations that weight maintenance/weight loss is expected during the classes. Failure to demonstrate changes may result in one extra month of weight loss trial, followed by going back to see the surgeon. 3. Patient is also instructed to be doing their labs, blood work, psych visit, support group and any other test that the surgeon has used while they are working on their weight loss trial.    Changes Made: no bread      Dietary Instruction    During today's class, we continued to focus on the key diet principles. Patient was instructed to follow a low carbohydrate diet, focusing on meat and vegetables. Patient was instructed to stop liquid calories and aim for 64 ounces of water per day.  We focused on focusing in on bigger problem areas to start making changes to, such as reducing fast food intake, reducing carbonated beverages/soda intake, decreasing carbohydrates intake daily, etc. We reviewed protein shakes and high protein yogurts to chose, as well. Patient was educated on good breakfast ideas and high protein snacks. I also reviewed with patient the post-op diet. Patient will hear this information again at pre op class prior to surgery, but I felt it was important to prepare them now. We covered the 7 day Pre-Op liquid diet prior to surgery, as well as the 7 day post-op liquid diet to get the body adjusted to their new pouch and \"jumpstart\" weight loss. We discussed portion sizes, focus on protein foods for the 1st 6 weeks post-op and approved SF fluids to consume. Patient understanded the importance of following this diet post op as to not regain weight and go back to old habits, and be most successful w/ their weight loss journey. Patient understands that surgery is only a tool in weight loss. Patient's diet habits include: Eating 3 meals daily, consisting of protein and starchy veggies. Biggest portion comes from meat. Carb source: rice, pasta, crackers, cookies. Snacking 4x/day on yogurt and crackers. Drinking 70 oz total of water and hot tea w/ honey. Struggling to completely let go of pasta, bread and cookies. Physical Activity/Exercise    Comments:     Currently for exercise, patient is using a recumbent bike instead od walking d/t swelling. We talked about activities for patient to do, including walking, swimming, or chair exercises, as well as some additional steps to take in the day to get extra movement, such as parking further away, walking dog, taking stairs vs elevator, etc. Patient was encouraged to start up an exercise routine and build on it. Behavior Modification  Comments:   Emphasized the importance of eating slowly, not eating and drinking meals at the same time.   Discussed Key Behavior principles to start implementing to be successful following surgery, such as, importance of 3 meals daily, keeping a food journal, avoid distractions during meal times, and chewing food thoroughly, taking 20-30 minutes to eat a meal, as a few examples. Patient understands the importance of following through with these behaviors following surgery to aid in long term weight loss. Patient's S.M.A.R.T. Goals are:  1. More raw foods  2. Biking  3. Less stress eating and finding other outlets    Patient has attended a support group meeting.       Marques Foster, RD  4/15/2021

## 2021-04-15 NOTE — PROGRESS NOTES
Nutrition Evaluation    Patient's Name: Jocelyn Gallardo   Age: 52 y.o. YOB: 1970   Sex: female    Height: 66\" Weight: 362.6 lbs home scale verified  BMI:  58.5  Starting Weight:  372        Smoking Status:  None  Alcohol Intake:  No  Number of Drinks at a Time: 0  Number of Times a Week: 0    Changes made during classes include:  More water  Lower carbs  No bread        Two things that patient learned during this weight loss trial:  Importance of adequate hydration   Importance of lower carbs to aid in weight loss    Summary:  I feel that Jocelyn Gallardo has demonstrated appropriate diet changes and is ready to move forward with surgery. Patient has been briefed on the importance of the protein drinks, vitamins, and the transition of the diet stages. Patient understands that the long-term diet will focus on protein and vegetables. Patient understand the effects of carbohydrates after surgery and what reactive hypoglycemia is. Patient is aware that they will be attending pre-op class 2 weeks before surgery and will get more detailed information on the post-op diet guidelines. Patient will see me again at 6 weeks post-op. At this 6 week visit, RD will assess how patient is tolerating soft protein and advance to vegetables, if tolerating soft protein without difficulty. Patient will also see RD again at 9 months post-op. This visit will assess patient's compliance with current protocol, including diet, vitamins, protein shakes, and exercise. Post-op diet guidelines will be reinforced. RD is available for questions and to meet with patient outside of the 6 week and 9 month post-op visit. We spent a lot of time talking about the vitamins. Patient understands the importance of being compliant with the diet protocol and the complications and risks that can occur if they are non-compliant with the nutritional protocol. Patient has attended at least one support group.   Patient has completed the [facility] Graduation Quiz to assess knowledge of post-op diet.        Candidate for surgery: Yes      Procedure: Band conv. to Bypass    Jethro Soto, NYA  4/15/2021

## 2022-01-21 RX ORDER — METOPROLOL SUCCINATE 25 MG/1
TABLET, EXTENDED RELEASE ORAL
Qty: 90 TABLET | Refills: 3 | Status: SHIPPED | OUTPATIENT
Start: 2022-01-21

## 2023-02-20 NOTE — PROGRESS NOTES
Subjective:      Yamini is in the office today for cardiac evaluation.  She is currently in the preoperative mode for upcoming bariatric surgery.  The patient has had a previous cardiac evaluation including a cardiac catheterization done 3 to 4 years ago.  The patient reports that all there were no blockages and it was felt to be a normal heart catheterization.  Those records are presently unavailable.  The patient also has a recent history of SVT which was self-limited.  She was seen in the emergency department [facility] and the narrow complex tachycardia with a rate of 160 bpm terminated without intervention.  Started on metoprolol 25 mg daily at that time.  She has had no recurrent symptoms in that regard.    The patient has had no recent chest pain.  She has had no PND or orthopnea.  She has had no peripheral swelling.  She denies recent palpitations since the event in late December.    She has been walking for the last 5 days without limiting dyspnea.  She believes she can easily walk 2 blocks without limiting dyspnea.  She.  She also believes she can walk up a flight of steps carrying a bag of groceries which she does on occasion without limiting dyspnea.     Patient's cardiac risk factors are obesity.        Patient Active Problem List    Diagnosis Date Noted   • Preoperative clearance 01/13/2021   • Abnormal ECG 01/13/2021   • History of cardiac catheterization 01/13/2021   • SVT (supraventricular tachycardia) ([facility]) 01/13/2021   • Obesity, morbid ([facility]) 02/25/2019   • BMI 50.0-59.9, adult ([facility]) 02/25/2019   • LAP-BAND surgery status 02/25/2019     Current Outpatient Medications   Medication Sig Dispense Refill   • levalbuterol tartrate (XOPENEX) 45 mcg/actuation inhaler      • losartan (COZAAR) 100 mg tablet TK 1 T PO  QD     • hyoscyamine SL (LEVSIN/SL) 0.125 mg SL tablet DISSOLVE 1 TABLET UNDER THE TONGUE EVERY 6 HOURS AS NEEDED FOR CRAMPING 10 Tab 0   • cetirizine (ZYRTEC) 10 mg tablet Take 10 mg  by mouth.  EPINEPHrine (EPIPEN 2-KIERSTEN) 0.3 mg/0.3 mL injection 1 Cartridge.  furosemide (LASIX) 20 mg tablet Take 20 mg by mouth.  budesonide-formoterol (SYMBICORT) 160-4.5 mcg/actuation HFAA Take 2 Puffs by inhalation.  amLODIPine (NORVASC) 5 mg tablet Take 5 mg by mouth.  ALPRAZolam (XANAX) 1 mg tablet Take 1 mg by mouth DIALYSIS PRN.  albuterol (PROVENTIL HFA, VENTOLIN HFA, PROAIR HFA) 90 mcg/actuation inhaler Take 2 Puffs by inhalation.  acetaminophen (TYLENOL EXTRA STRENGTH) 500 mg tablet Take 500 mg by mouth.  montelukast (SINGULAIR) 10 mg tablet Take 10 mg by mouth.  metoprolol succinate (TOPROL-XL) 25 mg XL tablet Take 1 Tab by mouth daily.  90 Tab 3     Allergies   Allergen Reactions    Peanut Hives     Anaphylactic shock     Oxycodone Hives     Past Medical History:   Diagnosis Date    Anxiety     Arthritis     Hypertension     Ovarian cancer ([facility] [address]     Stage 2    Renal carcinoma ([facility] [address]     Right T2     Past Surgical History:   Procedure Laterality Date    ADJUSTMENT GASTRIC BAND N/A 05/29/2019    ANDREY Caceres    [facility]. TOTAL HYSTERECTOMY  2013    Ovarian CA    HX CHOLECYSTECTOMY  2017    HX GI  2012    Lap Band    NEPHRECTOMY Right     Partial right nephrectomy due to cancer     Family History   Problem Relation Age of Onset    No Known Problems Mother     Hypertension Father      Social History     Tobacco Use   Smoking Status Never Smoker   Smokeless Tobacco Never Used          Review of Systems, additional:  Constitutional: negative  Eyes: negative  Respiratory: negative  Cardiovascular: negative  Gastrointestinal: negative  Musculoskeletal:negative  Neurological: negative  Behvioral/Psych: negative  Endocrine: negative  ENT: negative    Objective:     Visit Vitals  /67 (BP 1 Location: Right arm, BP Patient Position: Sitting)   Pulse 96   Temp 97.7 °F (36.5 °C) (Temporal)   Resp 16   Ht 5' 6\" (1.676 m)   Wt (!) 370 lb 9.6 oz (168.1 kg)   SpO2 98%   BMI 59.82 kg/m²     General:  alert, cooperative, no distress   Chest Wall: inspection normal - no chest wall deformities or tenderness, respiratory effort normal   Lung: clear to auscultation bilaterally   Heart:  normal rate and regular rhythm, S1 and S2 normal, no murmurs noted, no gallops noted, no JVD   Abdomen: soft, non-tender. Bowel sounds normal. No masses,  no organomegaly   Extremities: extremities normal, atraumatic, no cyanosis or edema Skin: no rashes   Neuro: alert, oriented, normal speech, no focal findings or movement disorder noted     EKG: See below    Assessment/Plan:         ICD-10-CM ICD-9-CM    1. Tachycardia  R00.0 785.0 ECHO ADULT COMPLETE   2. Pre-op testing  Z01.818 V72.84 ECHO ADULT COMPLETE   3. BMI 50.0-59.9, adult ([facility] [address]  Z68.43 V85.43    4. Preoperative clearance , bariatric surgery. Patient has no recent history suggestive of ischemic heart disease or decompensated heart failure. She did have an episode of SVT in the end of December 2020 and was seen at [facility] emergency department. She converted to sinus rhythm without intervention/spontaneously. She was started on metoprolol 25 mg daily. She has had no recurrent symptoms. Her electrocardiogram demonstrates sinus rhythm with septal Q waves and inability to exclude a prior septal wall infarction. It may well be related to lead placement. Will order echocardiogram to look specifically at overall ventricular function and specifically for wall motion abnormalities. The patient reports that she can easily walk 2 blocks without limiting dyspnea as well as carry a bag of groceries up a flight of steps without limiting dyspnea. Provided the echocardiogram does not show anything unexpected, would  be acceptable risk and reasonable to proceed with surgery. Z01.818 V72.84    5. Abnormal ECG  R94.31 794.31    6. History of cardiac catheterization , patient reported a normal heart catheterization 3 or 4 years ago.   Records unavailable Z98.890 V45.89    7. SVT (supraventricular tachycardia) ([facility]) , recent episode of terminated without intervention. She has had no further symptoms since the start of low-dose beta-blocker I47.1 427.89    8.  Obesity, morbid ([facility] [address]  E66.01 278.01 20-Feb-2023 06:53